# Patient Record
Sex: MALE | Race: WHITE | Employment: FULL TIME | URBAN - METROPOLITAN AREA
[De-identification: names, ages, dates, MRNs, and addresses within clinical notes are randomized per-mention and may not be internally consistent; named-entity substitution may affect disease eponyms.]

---

## 2017-02-20 ENCOUNTER — OFFICE VISIT (OUTPATIENT)
Dept: INTERNAL MEDICINE CLINIC | Age: 67
End: 2017-02-20

## 2017-02-20 VITALS
HEART RATE: 82 BPM | TEMPERATURE: 98.6 F | HEIGHT: 70 IN | RESPIRATION RATE: 16 BRPM | WEIGHT: 175 LBS | BODY MASS INDEX: 25.05 KG/M2 | OXYGEN SATURATION: 98 % | DIASTOLIC BLOOD PRESSURE: 67 MMHG | SYSTOLIC BLOOD PRESSURE: 118 MMHG

## 2017-02-20 DIAGNOSIS — J40 BRONCHITIS: Primary | ICD-10-CM

## 2017-02-20 DIAGNOSIS — J98.9 REACTIVE AIRWAY DISEASE THAT IS NOT ASTHMA: ICD-10-CM

## 2017-02-20 RX ORDER — BUDESONIDE AND FORMOTEROL FUMARATE DIHYDRATE 80; 4.5 UG/1; UG/1
2 AEROSOL RESPIRATORY (INHALATION) 2 TIMES DAILY
Qty: 1 INHALER | Refills: 0 | Status: SHIPPED | OUTPATIENT
Start: 2017-02-20 | End: 2017-03-31 | Stop reason: ALTCHOICE

## 2017-02-20 NOTE — PROGRESS NOTES
Have you been to the ER or urgent care clinic since your last visit? Urgent Care    2/9/17    benzonztate       2/15/17 doxy  For cough and    Drainage     Have you been hospitalized since your last visit? No     Have you been seen or consulted any other health care provider outside of [address] [place] [address] since your last visit (including pap smears, colonoscopy screening)?   No

## 2017-02-20 NOTE — MR AVS SNAPSHOT
Visit Information Date & Time Provider Department Dept. Phone Encounter #  
 2/20/2017  3:20 PM Betsy Chopra NP [facility] [address] [facility] [id number] Your Appointments 3/7/2017  2:30 PM  
COMPLETE PHYSICAL with Pedrito Oliveros MD  
[facility] [place] [address] [place] [address]) Appt Note: cpe  
 [address] [facility] [address]  
[phone]  
  
   
 [address] [place] [address] Upcoming Health Maintenance Date Due Hepatitis C Screening 1950 DTaP/Tdap/Td series (1 - Tdap) 4/13/1969 FOBT Q 1 YEAR AGE 50-75 4/13/1998 ZOSTER VACCINE AGE 60> 4/13/2008 GLAUCOMA SCREENING Q2Y 4/13/2013 MEDICARE YEARLY EXAM 4/13/2013 Pneumococcal 65+ Low/Medium Risk (2 of 2 - PPSV23) 12/11/2016 Allergies as of 2/20/2017  Review Complete On: 2/20/2017 By: Betsy Chopra NP No Known Allergies Current Immunizations  Reviewed on 8/31/2015 Name Date Influenza High Dose Vaccine PF 10/17/2016 Influenza Vaccine 12/11/2015, 1/1/2015 Pneumococcal Conjugate (PCV-13) 12/11/2015 Not reviewed this visit You Were Diagnosed With   
  
 Codes Comments Bronchitis    -  Primary ICD-10-CM: G89 ICD-9-CM: 244 Reactive airway disease that is not asthma     ICD-10-CM: R09.89 ICD-9-CM: 698. 9 Vitals BP Pulse Temp Resp Height(growth percentile) Weight(growth percentile)  
 118/67 (BP 1 Location: Left arm, BP Patient Position: Sitting) 82 98.6 °F (37 °C) (Oral) 16 5' 10\" (1.778 m) 175 lb (79.4 kg) SpO2 BMI Smoking Status 98% 25.11 kg/m2 Never Smoker BMI and BSA Data Body Mass Index Body Surface Area  
 25.11 kg/m 2 1.98 m 2 Preferred Pharmacy Pharmacy Name Phone [id number], [address] [facility] [phone] Your Updated Medication List  
  
   
 This list is accurate as of: 2/20/17  4:16 PM.  Always use your most recent med list.  
  
  
  
  
 biotin 2,500 mcg Tab Take  by mouth.  
  
 budesonide-formoterol 80-4.5 mcg/actuation Hfaa inhaler Commonly known as:  SYMBICORT Take 2 Puffs by inhalation two (2) times a day. Rinse mouth with water after each use FISH OIL 1,000 mg Cap Generic drug:  omega-3 fatty acids-vitamin e Take 1 Cap by mouth.  
  
 sildenafil citrate 50 mg tablet Commonly known as:  VIAGRA Take 1 Tab by mouth as needed. tadalafil 10 mg tablet Commonly known as:  CIALIS Take 10 mg by mouth as needed. VITAMIN D3 1,000 unit Cap Generic drug:  cholecalciferol Take  by mouth. Prescriptions Sent to Pharmacy Refills  
 budesonide-formoterol (SYMBICORT) 80-4.5 mcg/actuation HFAA inhaler 0 Sig: Take 2 Puffs by inhalation two (2) times a day. Rinse mouth with water after each use Class: Normal  
 Pharmacy: [facility] [place] [address] Ph #: [phone] Route: Inhalation Patient Instructions Bronchitis: Care Instructions Your Care Instructions Bronchitis is inflammation of the bronchial tubes, which carry air to the lungs. The tubes swell and produce mucus, or phlegm. The mucus and inflamed bronchial tubes make you cough. You may have trouble breathing. Most cases of bronchitis are caused by viruses like those that cause colds. Antibiotics usually do not help and they may be harmful. Bronchitis usually develops rapidly and lasts about 2 to 3 weeks in otherwise healthy people. Follow-up care is a key part of your treatment and safety. Be sure to make and go to all appointments, and call your doctor if you are having problems. It's also a good idea to know your test results and keep a list of the medicines you take. How can you care for yourself at home? · Take all medicines exactly as prescribed. Call your doctor if you think you are having a problem with your medicine. · Get some extra rest. 
· Take an over-the-counter pain medicine, such as acetaminophen (Tylenol), ibuprofen (Advil, Motrin), or naproxen (Aleve) to reduce fever and relieve body aches. Read and follow all instructions on the label. · Do not take two or more pain medicines at the same time unless the doctor told you to. Many pain medicines have acetaminophen, which is Tylenol. Too much acetaminophen (Tylenol) can be harmful. · Take an over-the-counter cough medicine that contains dextromethorphan to help quiet a dry, hacking cough so that you can sleep. Avoid cough medicines that have more than one active ingredient. Read and follow all instructions on the label. · Breathe moist air from a humidifier, hot shower, or sink filled with hot water. The heat and moisture will thin mucus so you can cough it out. · Do not smoke. Smoking can make bronchitis worse. If you need help quitting, talk to your doctor about stop-smoking programs and medicines. These can increase your chances of quitting for good. When should you call for help? Call 911 anytime you think you may need emergency care. For example, call if: 
· You have severe trouble breathing. Call your doctor now or seek immediate medical care if: 
· You have new or worse trouble breathing. · You cough up dark brown or bloody mucus (sputum). · You have a new or higher fever. · You have a new rash. Watch closely for changes in your health, and be sure to contact your doctor if: 
· You cough more deeply or more often, especially if you notice more mucus or a change in the color of your mucus. · You are not getting better as expected. Where can you learn more? Go to http://dixie-chanelle.info/. Enter H333 in the search box to learn more about \"Bronchitis: Care Instructions. \" Current as of: May 23, 2016 Content Version: 11.1 © 5817-8299 [employer]. Care instructions adapted under license by [employer] (which disclaims liability or warranty for this information). If you have questions about a medical condition or this instruction, always ask your healthcare professional. [address] any warranty or liability for your use of this information. Reactive Airway Disease: Care Instructions Your Care Instructions Reactive airway disease is a breathing problem that appears as wheezing, a whistling noise in your airways. It may be caused by a viral or bacterial infection, allergies, tobacco smoke, or something else in the environment. When you are around these triggers, your body releases chemicals that make the airways get tight. Reactive airway disease is a lot like asthma. Both can cause wheezing. But asthma is ongoing, while reactive airway disease may occur only now and then. Tests can be done to tell whether you have asthma. You may take the same medicines used to treat asthma. Good home care and follow-up care with your doctor can help you recover. Follow-up care is a key part of your treatment and safety. Be sure to make and go to all appointments, and call your doctor if you are having problems. It's also a good idea to know your test results and keep a list of the medicines you take. How can you care for yourself at home? · Take your medicines exactly as prescribed. Call your doctor if you think you are having a problem with your medicine. · Do not smoke or allow others to smoke around you. If you need help quitting, talk to your doctor about stop-smoking programs and medicines. These can increase your chances of quitting for good. · If you know what caused your wheezing (such as perfume or the odor of household chemicals), try to avoid it in the future.  
· Wash your hands several times a day, and consider using hand gels or wipes that contain alcohol. This can prevent colds and other infections. When should you call for help? Call 911 anytime you think you may need emergency care. For example, call if: 
· You have severe trouble breathing. Watch closely for changes in your health, and be sure to contact your doctor if: 
· You cough up yellow, dark brown, or bloody mucus. · You have a fever. · Your wheezing gets worse. Where can you learn more? Go to http://dixie-chanelle.info/. Enter C002 in the search box to learn more about \"Reactive Airway Disease: Care Instructions. \" Current as of: May 23, 2016 Content Version: 11.1 © 6266-5787 [employer]. Care instructions adapted under license by [employer] (which disclaims liability or warranty for this information). If you have questions about a medical condition or this instruction, always ask your healthcare professional. [place] [address] any warranty or liability for your use of this information. Introducing [facility]! [employer] introduces [address] patient portal. Now you can access parts of your medical record, email your doctor's office, and request medication refills online. 1. In your internet browser, go to [email or web address] 2. Click on the First Time User? Click Here link in the Sign In box. You will see the New Member Sign Up page. 3. Enter your [address] Access Code exactly as it appears below. You will not need to use this code after youve completed the sign-up process. If you do not sign up before the expiration date, you must request a new code. · [address] Access Code: [email or web address] Expires: 5/21/2017  4:16 PM 
 
4. Enter the last four digits of your Social Security Number (xxxx) and Date of Birth (mm/dd/yyyy) as indicated and click Submit. You will be taken to the next sign-up page. 5. Create a Inclinixt ID.  This will be your [address] login ID and cannot be changed, so think of one that is secure and easy to remember. 6. Create a [employer] password. You can change your password at any time. 7. Enter your Password Reset Question and Answer. This can be used at a later time if you forget your password. 8. Enter your e-mail address. You will receive e-mail notification when new information is available in [address] 9. Click Sign Up. You can now view and download portions of your medical record. 10. Click the Download Summary menu link to download a portable copy of your medical information. If you have questions, please visit the Frequently Asked Questions section of the [employer] website. Remember, [employer] is NOT to be used for urgent needs. For medical emergencies, dial 911. Now available from your iPhone and Android! Please provide this summary of care documentation to your next provider. Your primary care clinician is listed as Shalom Loja. If you have any questions after today's visit, please call [phone].

## 2017-02-20 NOTE — PATIENT INSTRUCTIONS
Bronchitis: Care Instructions  Your Care Instructions    Bronchitis is inflammation of the bronchial tubes, which carry air to the lungs. The tubes swell and produce mucus, or phlegm. The mucus and inflamed bronchial tubes make you cough. You may have trouble breathing. Most cases of bronchitis are caused by viruses like those that cause colds. Antibiotics usually do not help and they may be harmful. Bronchitis usually develops rapidly and lasts about 2 to 3 weeks in otherwise healthy people. Follow-up care is a key part of your treatment and safety. Be sure to make and go to all appointments, and call your doctor if you are having problems. It's also a good idea to know your test results and keep a list of the medicines you take. How can you care for yourself at home? · Take all medicines exactly as prescribed. Call your doctor if you think you are having a problem with your medicine. · Get some extra rest.  · Take an over-the-counter pain medicine, such as acetaminophen (Tylenol), ibuprofen (Advil, Motrin), or naproxen (Aleve) to reduce fever and relieve body aches. Read and follow all instructions on the label. · Do not take two or more pain medicines at the same time unless the doctor told you to. Many pain medicines have acetaminophen, which is Tylenol. Too much acetaminophen (Tylenol) can be harmful. · Take an over-the-counter cough medicine that contains dextromethorphan to help quiet a dry, hacking cough so that you can sleep. Avoid cough medicines that have more than one active ingredient. Read and follow all instructions on the label. · Breathe moist air from a humidifier, hot shower, or sink filled with hot water. The heat and moisture will thin mucus so you can cough it out. · Do not smoke. Smoking can make bronchitis worse. If you need help quitting, talk to your doctor about stop-smoking programs and medicines. These can increase your chances of quitting for good.   When should you call for help? Call 911 anytime you think you may need emergency care. For example, call if:  · You have severe trouble breathing. Call your doctor now or seek immediate medical care if:  · You have new or worse trouble breathing. · You cough up dark brown or bloody mucus (sputum). · You have a new or higher fever. · You have a new rash. Watch closely for changes in your health, and be sure to contact your doctor if:  · You cough more deeply or more often, especially if you notice more mucus or a change in the color of your mucus. · You are not getting better as expected. Where can you learn more? Go to http://dixie-chanelle.info/. Enter H333 in the search box to learn more about \"Bronchitis: Care Instructions. \"  Current as of: May 23, 2016  Content Version: 11.1  © 0942-0245 [employer]. Care instructions adapted under license by zwoor.[employer] (which disclaims liability or warranty for this information). If you have questions about a medical condition or this instruction, always ask your healthcare professional. [place] [address] any warranty or liability for your use of this information. Reactive Airway Disease: Care Instructions  Your Care Instructions  Reactive airway disease is a breathing problem that appears as wheezing, a whistling noise in your airways. It may be caused by a viral or bacterial infection, allergies, tobacco smoke, or something else in the environment. When you are around these triggers, your body releases chemicals that make the airways get tight. Reactive airway disease is a lot like asthma. Both can cause wheezing. But asthma is ongoing, while reactive airway disease may occur only now and then. Tests can be done to tell whether you have asthma. You may take the same medicines used to treat asthma. Good home care and follow-up care with your doctor can help you recover.   Follow-up care is a key part of your treatment and safety. Be sure to make and go to all appointments, and call your doctor if you are having problems. It's also a good idea to know your test results and keep a list of the medicines you take. How can you care for yourself at home? · Take your medicines exactly as prescribed. Call your doctor if you think you are having a problem with your medicine. · Do not smoke or allow others to smoke around you. If you need help quitting, talk to your doctor about stop-smoking programs and medicines. These can increase your chances of quitting for good. · If you know what caused your wheezing (such as perfume or the odor of household chemicals), try to avoid it in the future. · Wash your hands several times a day, and consider using hand gels or wipes that contain alcohol. This can prevent colds and other infections. When should you call for help? Call 911 anytime you think you may need emergency care. For example, call if:  · You have severe trouble breathing. Watch closely for changes in your health, and be sure to contact your doctor if:  · You cough up yellow, dark brown, or bloody mucus. · You have a fever. · Your wheezing gets worse. Where can you learn more? Go to http://dixie-chanelle.info/. Enter Q704 in the search box to learn more about \"Reactive Airway Disease: Care Instructions. \"  Current as of: May 23, 2016  Content Version: 11.1  © 1084-1576 [employer]. Care instructions adapted under license by [employer] (which disclaims liability or warranty for this information). If you have questions about a medical condition or this instruction, always ask your healthcare professional. [address] any warranty or liability for your use of this information.

## 2017-02-21 NOTE — PROGRESS NOTES
HISTORY OF PRESENT ILLNESS  Camilo Weiner is a 74 y.o. male. HPI  Presents with complaints of persistent cough for the past 2 weeks. Was seen at Urgent care center on 2/9/2017 with chest congestion, cough and diagnosed with viral bronchitis; was treated initially with Tessalon capsules but given Rx for Doxycycline to start if symptoms did not improve; he started this on 2/15 but has continued to cough. Stopped using Tessalon when he started the Doxycycline. Has been having coughing spasms with continual tickle at back of throat. Has not been able to run due to cough and slight shortness of breath. Denies history of asthma. Review of Systems   Constitutional: Positive for malaise/fatigue. Negative for chills and fever. HENT: Negative for congestion and sore throat. Respiratory: Positive for cough and shortness of breath. Negative for sputum production. Cardiovascular: Negative for chest pain and palpitations. Gastrointestinal: Negative for nausea and vomiting. Musculoskeletal: Negative for myalgias. Skin: Negative for rash. Neurological: Negative for dizziness, tingling and headaches. Physical Exam   Constitutional: He is oriented to person, place, and time. He appears well-developed and well-nourished. HENT:   Head: Normocephalic and atraumatic. Right Ear: External ear normal.   Left Ear: External ear normal.   Nose: Nose normal.   Mouth/Throat: Oropharynx is clear and moist.   Neck: Normal range of motion. Neck supple. No thyromegaly present. Cardiovascular: Normal rate and regular rhythm. Pulmonary/Chest: Effort normal. He has no wheezes. Upper chest congestion with bronchospastic cough   Lymphadenopathy:     He has no cervical adenopathy. Neurological: He is alert and oriented to person, place, and time. Psychiatric: He has a normal mood and affect. His behavior is normal.   Nursing note and vitals reviewed.       ASSESSMENT and [address] was seen today for cough.    Diagnoses and all orders for this visit:    Bronchitis -- advised to continue course of Doxycycline and can resume use of Tessalon every 8 hours as needed. Reactive airway disease that is not asthma  -     budesonide-formoterol (SYMBICORT) 80-4.5 mcg/actuation HFAA inhaler; Take 2 Puffs by inhalation two (2) times a day. Rinse mouth with water after each use    Follow up if signs and symptoms worsen or change. After hours number given.    reviewed diet, exercise and weight control  reviewed medications and side effects in detail

## 2017-02-22 ENCOUNTER — TELEPHONE (OUTPATIENT)
Dept: INTERNAL MEDICINE CLINIC | Age: 67
End: 2017-02-22

## 2017-02-22 RX ORDER — PREDNISONE 20 MG/1
20 TABLET ORAL
Qty: 10 TAB | Refills: 0 | Status: SHIPPED | OUTPATIENT
Start: 2017-02-22 | End: 2017-03-07 | Stop reason: ALTCHOICE

## 2017-02-22 RX ORDER — ALBUTEROL SULFATE 90 UG/1
1 AEROSOL, METERED RESPIRATORY (INHALATION)
Qty: 1 INHALER | Refills: 1 | Status: SHIPPED | OUTPATIENT
Start: 2017-02-22

## 2017-02-22 NOTE — TELEPHONE ENCOUNTER
Pt was seen by Kane Edwards on 2/20/17 and states he is still not feeling any better.  Requesting a return call from the nurse [phone]

## 2017-02-22 NOTE — TELEPHONE ENCOUNTER
He needs to give the Symbicort more time to work; has only been on it for 3 days. If still having issues next week, make appointment to be seen.

## 2017-02-22 NOTE — TELEPHONE ENCOUNTER
Spoke to pt, explained new rx's sent in for pt, if he doesn't get better after taking, he will need to follow up with PCP, pt verbalized understanding.

## 2017-02-22 NOTE — TELEPHONE ENCOUNTER
Patient reports still having cough attacks even after taking abx,tessalon and using inhaler symbicort. Pt was wondering if there is something else he can take for the cough?

## 2017-03-07 ENCOUNTER — OFFICE VISIT (OUTPATIENT)
Dept: INTERNAL MEDICINE CLINIC | Age: 67
End: 2017-03-07

## 2017-03-07 ENCOUNTER — HOSPITAL ENCOUNTER (OUTPATIENT)
Dept: GENERAL RADIOLOGY | Age: 67
Discharge: HOME | End: 2017-03-07
Attending: INTERNAL MEDICINE
Payer: COMMERCIAL

## 2017-03-07 VITALS
SYSTOLIC BLOOD PRESSURE: 130 MMHG | HEART RATE: 86 BPM | HEIGHT: 70 IN | DIASTOLIC BLOOD PRESSURE: 83 MMHG | WEIGHT: 176 LBS | BODY MASS INDEX: 25.2 KG/M2 | OXYGEN SATURATION: 98 % | RESPIRATION RATE: 18 BRPM | TEMPERATURE: 98.2 F

## 2017-03-07 DIAGNOSIS — C44.90 SKIN CANCER: ICD-10-CM

## 2017-03-07 DIAGNOSIS — Z00.00 PREVENTATIVE HEALTH CARE: Primary | ICD-10-CM

## 2017-03-07 DIAGNOSIS — J45.998 SEASONAL ASTHMA: ICD-10-CM

## 2017-03-07 DIAGNOSIS — K40.91 UNILATERAL RECURRENT INGUINAL HERNIA WITHOUT OBSTRUCTION OR GANGRENE: ICD-10-CM

## 2017-03-07 DIAGNOSIS — N52.9 ERECTILE DYSFUNCTION, UNSPECIFIED ERECTILE DYSFUNCTION TYPE: ICD-10-CM

## 2017-03-07 DIAGNOSIS — L57.8 SUN-DAMAGED SKIN: ICD-10-CM

## 2017-03-07 DIAGNOSIS — Z23 ENCOUNTER FOR IMMUNIZATION: ICD-10-CM

## 2017-03-07 PROCEDURE — 71020 XR CHEST PA LAT: CPT

## 2017-03-07 RX ORDER — TADALAFIL 10 MG/1
10 TABLET ORAL AS NEEDED
Qty: 10 TAB | Refills: 11 | Status: SHIPPED | OUTPATIENT
Start: 2017-03-07

## 2017-03-07 NOTE — PROGRESS NOTES
HISTORY OF PRESENT ILLNESS  Chyna Garcia is a 74 y.o. male. HPI  Chyna Garcia is here for complete health maintenance physical exam and screening. he does have other concerns. recurrant chest congestion, wheezing, cough in wintertime. Given symbicort, albuterol and eventually prednisone for relief. Better now. Has stopped symbicort. Hx of ? BCC on face and treated with mohs surgery years ago. Has seen dermatologist randomly but without good skin checks since. No suspicious lesions now. Hx of L inguinal hernia which self reduces. Rarely uncomfortable. No changes since last checked. Has been unable to get to surgeon until now. Health maintenance hx includes:  Exercise: moderately active. Form of exercise: walking   Diet: generally follows a low fat low cholesterol diet, generally follows a low sodium diet, exercises sporadically, nonsmoker    Cancer screening:    Colon cancer screening:  Last Colonoscopy: 2010 and was normal   Prostate cancer screening: PSA and/or LUCERO:   Lab Results   Component Value Date/Time    Prostate Specific Ag 1.4 08/31/2015 12:07 PM          Lab Results   Component Value Date/Time    Cholesterol, total 169 08/31/2015 12:07 PM    HDL Cholesterol 45 08/31/2015 12:07 PM    LDL, calculated 108 08/31/2015 12:07 PM    VLDL, calculated 16 08/31/2015 12:07 PM    Triglyceride 78 08/31/2015 12:07 PM       Lab Results   Component Value Date/Time    Glucose 94 08/31/2015 12:07 PM         Immunizations:     Immunization History   Administered Date(s) Administered    Influenza High Dose Vaccine PF 10/17/2016    Influenza Vaccine 01/01/2015, 12/11/2015    Pneumococcal Conjugate (PCV-13) 12/11/2015      Immunization status: up to date and documented. Social History     Social History    Marital status: LEGALLY      Spouse name: N/A    Number of children: N/A    Years of education: N/A     Occupational History    Not on file.      Social History Main Topics  Smoking status: Never Smoker    Smokeless tobacco: Not on file    Alcohol use 1.8 - 2.4 oz/week     3 - 4 Glasses of wine per week    Drug use: No    Sexual activity: Yes     Partners: Female     Other Topics Concern    Not on file     Social History Narrative     History reviewed. No pertinent surgical history. Family History   Problem Relation Age of Onset    Heart Disease Father     Cancer Neg Hx     Diabetes Neg Hx     Hypertension Neg Hx      Current Outpatient Prescriptions on File Prior to Visit   Medication Sig Dispense Refill    albuterol (PROVENTIL HFA, VENTOLIN HFA, PROAIR HFA) 90 mcg/actuation inhaler Take 1 Puff by inhalation every four (4) hours as needed for Wheezing. 1 Inhaler 1    budesonide-formoterol (SYMBICORT) 80-4.5 mcg/actuation HFAA inhaler Take 2 Puffs by inhalation two (2) times a day. Rinse mouth with water after each use 1 Inhaler 0    tadalafil (CIALIS) 10 mg tablet Take 10 mg by mouth as needed. 10 Tab 11     No current facility-administered medications on file prior to visit. .    Review of Systems   Constitutional: Negative for malaise/fatigue and weight loss. HENT: Positive for tinnitus (chronic high pitched, bilateral.  not bothersome. no noise trauma). Negative for ear discharge, ear pain and sore throat. Eyes: Negative for blurred vision and pain. Respiratory: Positive for cough and wheezing (improved as above). Negative for shortness of breath. Cardiovascular: Negative for chest pain, palpitations and leg swelling. Gastrointestinal: Negative for abdominal pain, blood in stool, constipation, diarrhea, heartburn, nausea and vomiting. Genitourinary: Negative. Erectile dysfunction - good result with cialis     Musculoskeletal: Negative for back pain, joint pain and myalgias. Skin: Negative for rash. Neurological: Negative for dizziness and headaches. Endo/Heme/Allergies: Negative for environmental allergies.  Does not bruise/bleed easily. Psychiatric/Behavioral: Negative for depression. The patient is not nervous/anxious and does not have insomnia. Physical Exam   Constitutional: He is oriented to person, place, and time. He appears well-developed and well-nourished. No distress. Body mass index is 25.25 kg/(m^2). /83 (BP 1 Location: Left arm, BP Patient Position: Sitting)  Pulse 86  Temp 98.2 °F (36.8 °C) (Oral)   Resp 18  Ht 5' 10\" (1.778 m)  Wt 176 lb (79.8 kg)  SpO2 98%  BMI 25.25 kg/m2   HENT:   Head: Normocephalic and atraumatic. Right Ear: Hearing, tympanic membrane and ear canal normal.   Left Ear: Hearing, tympanic membrane and ear canal normal.   Nose: Nose normal.   Mouth/Throat: Oropharynx is clear and moist and mucous membranes are normal. Normal dentition. Eyes: Conjunctivae and lids are normal. Pupils are equal, round, and reactive to light. Right eye exhibits no discharge. Left eye exhibits no discharge. No scleral icterus. Neck: Trachea normal. No thyromegaly present. Cardiovascular: Normal rate, regular rhythm, normal heart sounds, intact distal pulses and normal pulses. Exam reveals no gallop and no friction rub. No murmur heard. Pulmonary/Chest: Effort normal and breath sounds normal. No accessory muscle usage. No respiratory distress. He has no decreased breath sounds (diffusely coarse BS). He has no wheezes. He has no rhonchi. He has no rales. Abdominal: Soft. Normal appearance and bowel sounds are normal. He exhibits no distension and no mass. There is no hepatosplenomegaly. There is no tenderness. There is no CVA tenderness. Musculoskeletal: Normal range of motion. He exhibits no edema or tenderness. Lymphadenopathy:     He has no cervical adenopathy. Right: No inguinal adenopathy present. Left: No inguinal adenopathy present. Neurological: He is alert and oriented to person, place, and time. Skin: Skin is warm and dry. No rash noted. He is not diaphoretic. Diffuse moderate sun damaged skin   Psychiatric: He has a normal mood and affect. His speech is normal and behavior is normal. Judgment and thought content normal. Cognition and memory are normal.   Nursing note and vitals reviewed. ASSESSMENT and [facility] was seen today for well male. Diagnoses and all orders for this visit:    Preventative health care  -     Rigo Knapp  he was advised to have follow up colonoscopy in 2020  [address] [facility] [address] [facility] [facility] was counseled on age-appropriate/ guideline-based risk prevention behaviors and screening for a 74y.o. year old   male . We also discussed adjustments in screening based on family history if necessary. Printed instructions for preventative screening guidelines and healthy behaviors given to patient with after visit summary. Seasonal asthma -  ICS/LABA every winter for 2-3 months , albuterol for rescue  Check baseline chest X-ray   -     XR CHEST PA LAT; Future    Unilateral recurrent inguinal hernia without obstruction or gangrene -he would now like to consult surgeon for repair.  -     REFERRAL TO SURGERY    Encounter for immunization  -     PNEUMOCOCCAL POLYSACCHARIDE VACCINE, 23-VALENT, ADULT OR IMMUNOSUPPRESSED PT DOSE,    Erectile dysfunction, unspecified erectile dysfunction type - Well controlled and stable. his medications were reviewed and refilled where necessary as noted below. Labs ordered as noted. -     tadalafil (CIALIS) 10 mg tablet; Take 1 Tab by mouth as needed.     Skin cancer  -     REFERRAL TO DERMATOLOGY    Sun-damaged skin  -     REFERRAL TO DERMATOLOGY      Follow-up Disposition: Not on File  lab results and schedule of future lab studies reviewed with patient  reviewed medications and side effects in detail

## 2017-03-07 NOTE — MR AVS SNAPSHOT
Visit Information Date & Time Provider Department Dept. Phone Encounter #  
 3/7/2017  2:30 PM Kevin Khan MD [facility] [id number] Upcoming Health Maintenance Date Due Hepatitis C Screening 1950 COLONOSCOPY 4/13/1966 DTaP/Tdap/Td series (1 - Tdap) 4/13/1969 ZOSTER VACCINE AGE 60> 4/13/2008 GLAUCOMA SCREENING Q2Y 4/13/2013 Pneumococcal 65+ Low/Medium Risk (2 of 2 - PPSV23) 12/11/2016 MEDICARE YEARLY EXAM 3/8/2018 Allergies as of 3/7/2017  Review Complete On: 3/7/2017 By: Kevin Khan MD  
 No Known Allergies Current Immunizations  Reviewed on 3/7/2017 Name Date Influenza High Dose Vaccine PF 10/17/2016 Influenza Vaccine 12/11/2015, 1/1/2015 Pneumococcal Conjugate (PCV-13) 12/11/2015 Pneumococcal Polysaccharide (PPSV-23)  Incomplete Reviewed by Kevin Khan MD on 3/7/2017 at  3:11 PM  
 Reviewed by Kevin Khan MD on 3/7/2017 at  3:13 PM  
You Were Diagnosed With   
  
 Codes Comments Preventative health care    -  Primary ICD-10-CM: Z00.00 ICD-9-CM: V70.0 Seasonal asthma     ICD-10-CM: J45.998 ICD-9-CM: 493.90 Unilateral recurrent inguinal hernia without obstruction or gangrene     ICD-10-CM: K40.91 
ICD-9-CM: 550.91 Encounter for immunization     ICD-10-CM: E69 ICD-9-CM: V03.89 Erectile dysfunction, unspecified erectile dysfunction type     ICD-10-CM: N52.9 ICD-9-CM: 607.84 Skin cancer     ICD-10-CM: C44.90 ICD-9-CM: 173.90 Vitals BP Pulse Temp Resp Height(growth percentile) Weight(growth percentile) 130/83 (BP 1 Location: Left arm, BP Patient Position: Sitting) 86 98.2 °F (36.8 °C) (Oral) 18 5' 10\" (1.778 m) 176 lb (79.8 kg) SpO2 BMI Smoking Status 98% 25.25 kg/m2 Never Smoker Vitals History BMI and BSA Data Body Mass Index Body Surface Area  
 25.25 kg/m 2 1.99 m 2 Preferred Pharmacy Pharmacy Name Phone [id number] [place] [address] [facility] [phone] Your Updated Medication List  
  
   
This list is accurate as of: 3/7/17  3:25 PM.  Always use your most recent med list.  
  
  
  
  
 albuterol 90 mcg/actuation inhaler Commonly known as:  PROVENTIL HFA, VENTOLIN HFA, PROAIR HFA Take 1 Puff by inhalation every four (4) hours as needed for Wheezing. budesonide-formoterol 80-4.5 mcg/actuation Hfaa inhaler Commonly known as:  SYMBICORT Take 2 Puffs by inhalation two (2) times a day. Rinse mouth with water after each use  
  
 tadalafil 10 mg tablet Commonly known as:  CIALIS Take 1 Tab by mouth as needed. Prescriptions Sent to Pharmacy Refills  
 tadalafil (CIALIS) 10 mg tablet 11 Sig: Take 1 Tab by mouth as needed. Class: Normal  
 Pharmacy: [facility]/pharmacy #[id number]- [place] [address] Ph #: [phone] Route: Oral  
  
We Performed the Following LIPID PANEL [06649 CPT(R)] METABOLIC PANEL, BASIC [02853 CPT(R)] PNEUMOCOCCAL POLYSACCHARIDE VACCINE, 23-VALENT, ADULT OR IMMUNOSUPPRESSED PT DOSE, [67921 CPT(R)] PROSTATE SPECIFIC AG (PSA) [id number] CPT(R)] REFERRAL TO DERMATOLOGY [REF19 Custom] REFERRAL TO SURGERY [RXK876 Custom] To-Do List   
 03/07/2017 Imaging:  XR CHEST PA LAT Referral Information Referral ID Referred By Referred To  
  
 [id number] [facility] [address] [place] [address] Cornel Nguyen MD   
   [address] [place] [address] Phone: [phone] Fax: [phone] Visits Status Start Date End Date 1 New Request 3/7/17 3/7/18 If your referral has a status of pending review or denied, additional information will be sent to support the outcome of this decision. Referral ID Referred By Referred To [id number] MD Hector  
   [facility] [place], [facility] [address] [place] [address] Phone: [phone] Fax: [phone] Visits Status Start Date End Date 1 New Request 3/7/17 3/7/18 If your referral has a status of pending review or denied, additional information will be sent to support the outcome of this decision. Patient Instructions Well Visit, Over 72: Care Instructions Your Care Instructions Physical exams can help you stay healthy. Your doctor has checked your overall health and may have suggested ways to take good care of yourself. He or she also may have recommended tests. At home, you can help prevent illness with healthy eating, regular exercise, and other steps. Follow-up care is a key part of your treatment and safety. Be sure to make and go to all appointments, and call your doctor if you are having problems. It's also a good idea to know your test results and keep a list of the medicines you take. How can you care for yourself at home? · Reach and stay at a healthy weight. This will lower your risk for many problems, such as obesity, diabetes, heart disease, and high blood pressure. · Get at least 30 minutes of exercise on most days of the week. Walking is a good choice. You also may want to do other activities, such as running, swimming, cycling, or playing tennis or team sports. · Do not smoke. Smoking can make health problems worse. If you need help quitting, talk to your doctor about stop-smoking programs and medicines. These can increase your chances of quitting for good. · Protect your skin from too much sun. When you're outdoors from 10 a.m. to 4 p.m., stay in the shade or cover up with clothing and a hat with a wide brim. Wear sunglasses that block UV rays. Even when it's cloudy, put broad-spectrum sunscreen (SPF 30 or higher) on any exposed skin. · See a dentist one or two times a year for checkups and to have your teeth cleaned. · Wear a seat belt in the car. · Limit alcohol to 2 drinks a day for men and 1 drink a day for women. Too much alcohol can cause health problems. Follow your doctor's advice about when to have certain tests. These tests can spot problems early. For men and women · Cholesterol. Your doctor will tell you how often to have this done based on your overall health and other things that can increase your risk for heart attack and stroke. · Blood pressure. Have your blood pressure checked during a routine doctor visit. Your doctor will tell you how often to check your blood pressure based on your age, your blood pressure results, and other factors. · Diabetes. Ask your doctor whether you should have tests for diabetes. · Vision. Experts recommend that you have yearly exams for glaucoma and other age-related eye problems. · Hearing. Tell your doctor if you notice any change in your hearing. You can have tests to find out how well you hear. · Colon cancer tests. Keep having colon cancer tests as your doctor recommends. You can have one of several types of tests. · Heart attack and stroke risk. At least every 4 to 6 years, you should have your risk for heart attack and stroke assessed. Your doctor uses factors such as your age, blood pressure, cholesterol, and whether you smoke or have diabetes to show what your risk for a heart attack or stroke is over the next 10 years. · Osteoporosis. Talk to your doctor about whether you should have a bone density test to find out whether you have thinning bones. Also ask your doctor about whether you should take calcium and vitamin D supplements. For women · Pap test and pelvic exam. You may no longer need a Pap test. Talk with your doctor about whether to stop or continue to have Pap tests. · Breast exam and mammogram. Ask how often you should have a mammogram, which is an X-ray of your breasts.  A mammogram can spot breast cancer before it can be felt and when it is easiest to treat. · Thyroid disease. Talk to your doctor about whether to have your thyroid checked as part of a regular physical exam. Women have an increased chance of a thyroid problem. For men · Prostate exam. Talk to your doctor about whether you should have a blood test (called a PSA test) for prostate cancer. Experts disagree on whether men should have this test. Some experts recommend that you discuss the benefits and risks of the test with your doctor. · Abdominal aortic aneurysm. Ask your doctor whether you should have a test to check for an aneurysm. You may need a test if you ever smoked or if your parent, brother, sister, or child has had an aneurysm. When should you call for help? Watch closely for changes in your health, and be sure to contact your doctor if you have any problems or symptoms that concern you. Where can you learn more? Go to http://dixie-chanelle.info/. Enter A942 in the search box to learn more about \"Well Visit, Over 65: Care Instructions. \" Current as of: July 19, 2016 Content Version: 11.1 © 5092-9815 Healthwise, Incorporated. Care instructions adapted under license by [employer] (which disclaims liability or warranty for this information). If you have questions about a medical condition or this instruction, always ask your healthcare professional. [address] any warranty or liability for your use of this information. Introducing \[facility]\"" & HEALTH SERVICES! Kandi Miles introduces [employer] patient portal. Now you can access parts of your medical record, email your doctor's office, and request medication refills online. 1. In your internet browser, go to [email or web address] 2. Click on the First Time User? Click Here link in the Sign In box. You will see the New Member Sign Up page. 3. Enter your [employer] Access Code exactly as it appears below.  You will not need to use this code after youve completed the sign-up process. If you do not sign up before the expiration date, you must request a new code. · [employer] Access Code: [email or web address] Expires: 5/21/2017  4:16 PM 
 
4. Enter the last four digits of your Social Security Number (xxxx) and Date of Birth (mm/dd/yyyy) as indicated and click Submit. You will be taken to the next sign-up page. 5. Create a [employer] ID. This will be your [employer] login ID and cannot be changed, so think of one that is secure and easy to remember. 6. Create a [employer] password. You can change your password at any time. 7. Enter your Password Reset Question and Answer. This can be used at a later time if you forget your password. 8. Enter your e-mail address. You will receive e-mail notification when new information is available in [address] 9. Click Sign Up. You can now view and download portions of your medical record. 10. Click the Download Summary menu link to download a portable copy of your medical information. If you have questions, please visit the Frequently Asked Questions section of the [employer] website. Remember, [employer] is NOT to be used for urgent needs. For medical emergencies, dial 911. Now available from your iPhone and Android! Please provide this summary of care documentation to your next provider. Your primary care clinician is listed as Kendell Dickson. If you have any questions after today's visit, please call [phone].

## 2017-03-07 NOTE — PROGRESS NOTES
[facility]  Timeout Progress Note    Chart reviewed for allergies/reaction to any medications. TIMEOUT initiated prior to start of procedure:       Yes No: yes Patient identified by name and date of birth     Yes No: yes Informed consent obtained     Yes No: yes Procedure site marked and verified     Yes No: yes Procedure to be performed verified, patient confirms understanding     Yes No: yes Pain assessment pre-procedure - Pain 0/10     Yes No: yes Pain assessment post-procedure - Pain 0/10     Yes No: yes Patient education provided     Yes No: yes Post-procedure instructions provided to patient     Consent form signed and verified. Patient tolerated procedure well.

## 2017-03-07 NOTE — PATIENT INSTRUCTIONS

## 2017-03-09 LAB
BUN SERPL-MCNC: 18 MG/DL (ref 8–27)
BUN/CREAT SERPL: 20 (ref 10–22)
CALCIUM SERPL-MCNC: 8.6 MG/DL (ref 8.6–10.2)
CHLORIDE SERPL-SCNC: 97 MMOL/L (ref 96–106)
CHOLEST SERPL-MCNC: 156 MG/DL (ref 100–199)
CO2 SERPL-SCNC: 24 MMOL/L (ref 18–29)
CREAT SERPL-MCNC: 0.88 MG/DL (ref 0.76–1.27)
GLUCOSE SERPL-MCNC: 111 MG/DL (ref 65–99)
HDLC SERPL-MCNC: 41 MG/DL
INTERPRETATION, 910389: NORMAL
LDLC SERPL CALC-MCNC: 98 MG/DL (ref 0–99)
POTASSIUM SERPL-SCNC: 4.2 MMOL/L (ref 3.5–5.2)
PSA SERPL-MCNC: 1.1 NG/ML (ref 0–4)
SODIUM SERPL-SCNC: 138 MMOL/L (ref 134–144)
TRIGL SERPL-MCNC: 83 MG/DL (ref 0–149)
VLDLC SERPL CALC-MCNC: 17 MG/DL (ref 5–40)

## 2017-03-10 ENCOUNTER — TELEPHONE (OUTPATIENT)
Dept: INTERNAL MEDICINE CLINIC | Age: 67
End: 2017-03-10

## 2017-03-10 NOTE — TELEPHONE ENCOUNTER
Demi called to follow up on the xray the patient had done on 3/7/2017. She was verifying that Dr. Estephanie Ramon is able to see the report. Informed report is in the system and will be sent to Dr. Estephanie Ramon for review.

## 2017-03-10 NOTE — TELEPHONE ENCOUNTER
I have attempted without success to contact this patient by phone to I left a message on answering machine.   Who is Crystal??

## 2017-03-15 ENCOUNTER — OFFICE VISIT (OUTPATIENT)
Dept: INTERNAL MEDICINE CLINIC | Age: 67
End: 2017-03-15

## 2017-03-15 VITALS
BODY MASS INDEX: 25.43 KG/M2 | DIASTOLIC BLOOD PRESSURE: 84 MMHG | WEIGHT: 177.6 LBS | RESPIRATION RATE: 12 BRPM | TEMPERATURE: 98.3 F | OXYGEN SATURATION: 96 % | SYSTOLIC BLOOD PRESSURE: 137 MMHG | HEART RATE: 91 BPM | HEIGHT: 70 IN

## 2017-03-15 DIAGNOSIS — R06.09 DOE (DYSPNEA ON EXERTION): ICD-10-CM

## 2017-03-15 DIAGNOSIS — R05.3 CHRONIC COUGH: Primary | ICD-10-CM

## 2017-03-15 NOTE — MR AVS SNAPSHOT
Visit Information Date & Time Provider Department Dept. Phone Encounter #  
 3/15/2017  1:45 PM Brendon Shaffer MD [facility] [address] [facility] [id number] Follow-up Instructions Return in about 1 week (around 3/22/2017). Upcoming Health Maintenance Date Due Hepatitis C Screening 1950 COLONOSCOPY 4/13/1966 DTaP/Tdap/Td series (1 - Tdap) 4/13/1969 ZOSTER VACCINE AGE 60> 4/13/2008 GLAUCOMA SCREENING Q2Y 4/13/2013 MEDICARE YEARLY EXAM 3/8/2018 Allergies as of 3/15/2017  Review Complete On: 3/15/2017 By: Jay Nguyen No Known Allergies Current Immunizations  Reviewed on 3/7/2017 Name Date Influenza High Dose Vaccine PF 10/17/2016 Influenza Vaccine 12/11/2015, 1/1/2015 Pneumococcal Conjugate (PCV-13) 12/11/2015 Pneumococcal Polysaccharide (PPSV-23) 3/7/2017 Not reviewed this visit You Were Diagnosed With   
  
 Codes Comments Chronic cough    -  Primary ICD-10-CM: I10 ICD-9-CM: 786.2 DEMARCO (dyspnea on exertion)     ICD-10-CM: R06.09 
ICD-9-CM: 786.09 Vitals BP Pulse Temp Resp Height(growth percentile) Weight(growth percentile) 137/84 (BP 1 Location: Left arm, BP Patient Position: Sitting) 91 98.3 °F (36.8 °C) (Oral) 12 5' 10\" (1.778 m) 177 lb 9.6 oz (80.6 kg) SpO2 BMI Smoking Status 96% 25.48 kg/m2 Never Smoker Vitals History BMI and BSA Data Body Mass Index Body Surface Area  
 25.48 kg/m 2 2 m 2 Preferred Pharmacy Pharmacy Name Phone [id number] [place], [address] [facility] [phone] Your Updated Medication List  
  
   
This list is accurate as of: 3/15/17  2:26 PM.  Always use your most recent med list.  
  
  
  
  
 albuterol 90 mcg/actuation inhaler Commonly known as:  PROVENTIL HFA, VENTOLIN HFA, PROAIR HFA  
 Take 1 Puff by inhalation every four (4) hours as needed for Wheezing. budesonide-formoterol 80-4.5 mcg/actuation Hfaa inhaler Commonly known as:  SYMBICORT Take 2 Puffs by inhalation two (2) times a day. Rinse mouth with water after each use  
  
 tadalafil 10 mg tablet Commonly known as:  CIALIS Take 1 Tab by mouth as needed. We Performed the Following CBC WITH AUTOMATED DIFF [90514 CPT(R)] D DIMER [id number] CPT(R)] Follow-up Instructions Return in about 1 week (around 3/22/2017). To-Do List   
 03/16/2017 Imaging:  CT CHEST W WO CONT Introducing [facility] & HEALTH SERVICES! Devin Pedersen introduces [employer] patient portal. Now you can access parts of your medical record, email your doctor's office, and request medication refills online. 1. In your internet browser, go to [email or web address] 2. Click on the First Time User? Click Here link in the Sign In box. You will see the New Member Sign Up page. 3. Enter your [employer] Access Code exactly as it appears below. You will not need to use this code after youve completed the sign-up process. If you do not sign up before the expiration date, you must request a new code. · [employer] Access Code: [email or web address] Expires: 5/21/2017  5:16 PM 
 
4. Enter the last four digits of your Social Security Number (xxxx) and Date of Birth (mm/dd/yyyy) as indicated and click Submit. You will be taken to the next sign-up page. 5. Create a Hyphen 8t ID. This will be your [employer] login ID and cannot be changed, so think of one that is secure and easy to remember. 6. Create a [employer] password. You can change your password at any time. 7. Enter your Password Reset Question and Answer. This can be used at a later time if you forget your password. 8. Enter your e-mail address. You will receive e-mail notification when new information is available in [address] 9. Click Sign Up. You can now view and download portions of your medical record. 10. Click the Download Summary menu link to download a portable copy of your medical information. If you have questions, please visit the Frequently Asked Questions section of the [employer] website. Remember, [employer] is NOT to be used for urgent needs. For medical emergencies, dial 911. Now available from your iPhone and Android! Please provide this summary of care documentation to your next provider. Your primary care clinician is listed as Fernando Castillo. If you have any questions after today's visit, please call [phone].

## 2017-03-15 NOTE — PROGRESS NOTES
HISTORY OF PRESENT ILLNESS  Johana Loaiza is a 74 y.o. male. HPI  Problem follow up: Johana Loaiza returns for follow up visit regarding ongoing cough, mild dyspnea on exertion. he was seen 1 weeks ago in office diagnosed with persistant cough, ? Seasonal cough variant asthma and treated with symbicort/ albuterol. Workup was significant for chest X-ray  Exam: 2 view chest     Indication: Cough, asthma, coarse breath sounds.     COMPARISON: None     PA and lateral views demonstrate normal heart size.     There is a left pleural effusion present. This is small but does blunt the left  lateral posterior costophrenic angles. This causes some atelectasis at the left  lung base. Etiology is uncertain follow-up to resolution is necessary.     Right lung is clear. No adenopathy.     IMPRESSION  IMPRESSION:  1. There is a small left pleural effusion. Etiology is uncertain. Infectious  inflammatory or neoplastic processes should be considered. Follow-up to  resolution is suggested. 23X     .  Notes, labs, studies, imaging related to this problem during prior visit were available . Since that visit, he has not changed or possibly improved. he has been compliant with prescribed treatment. Residual symptoms include: recurring cough, mild dyspnea on exertion. The patient denies fevers, chills or sweats. No chest pain, leg swelling. New issues associated with this problem include: none. Patient Active Problem List   Diagnosis Code    Skin cancer C44.90     Past Medical History:   Diagnosis Date    Asthma     Basal cell carcinoma of left forehead     Sangaray    Hernia      No Known Allergies  Current Outpatient Prescriptions on File Prior to Visit   Medication Sig Dispense Refill    tadalafil (CIALIS) 10 mg tablet Take 1 Tab by mouth as needed. 10 Tab 11    albuterol (PROVENTIL HFA, VENTOLIN HFA, PROAIR HFA) 90 mcg/actuation inhaler Take 1 Puff by inhalation every four (4) hours as needed for Wheezing.  1 Inhaler 1    budesonide-formoterol (SYMBICORT) 80-4.5 mcg/actuation HFAA inhaler Take 2 Puffs by inhalation two (2) times a day. Rinse mouth with water after each use 1 Inhaler 0     No current facility-administered medications on file prior to visit. Social History   Substance Use Topics    Smoking status: Never Smoker    Smokeless tobacco: None    Alcohol use 1.8 - 2.4 oz/week     3 - 4 Glasses of wine per week         ROS    Physical Exam   Constitutional: He appears well-developed and well-nourished. No distress. /84 (BP 1 Location: Left arm, BP Patient Position: Sitting)  Pulse 91  Temp 98.3 °F (36.8 °C) (Oral)   Resp 12  Ht 5' 10\" (1.778 m)  Wt 177 lb 9.6 oz (80.6 kg)  SpO2 96%  BMI 25.48 kg/m2Body mass index is 25.48 kg/(m^2). HENT:   Mouth/Throat: Oropharynx is clear and moist.   Neck: No JVD present. Carotid bruit is not present. Cardiovascular: Normal rate, regular rhythm, normal heart sounds and intact distal pulses. No murmur heard. Pulses:       Posterior tibial pulses are 1+ on the right side, and 1+ on the left side. Pulmonary/Chest: Effort normal. No accessory muscle usage. No respiratory distress. He has decreased breath sounds in the left lower field. He has no wheezes. He has no rhonchi. He has no rales. Musculoskeletal: He exhibits no edema. Neurological: He is alert. Skin: Skin is warm and dry. He is not diaphoretic. Nursing note and vitals reviewed. ASSESSMENT and [facility] was seen today for cough. Diagnoses and all orders for this visit:    Chronic cough -? persistant mild asthma vs resolving URI - ? Pertussis vs underlying chronic lung disease given small pleural effusion. No major risks for VTE, CHF, lung CA, IPF. Will get DD and CT chest.  Close follow up.   Consider ECHO if still not obvious.  -     CT CHEST W WO CONT; Future  -     D DIMER    DEMARCO (dyspnea on exertion)  -     CT CHEST W WO CONT; Future  -     CBC WITH AUTOMATED DIFF      Follow-up Disposition:  Return in about 1 week (around 3/22/2017).

## 2017-03-16 LAB
BASOPHILS # BLD AUTO: 0 X10E3/UL (ref 0–0.2)
BASOPHILS NFR BLD AUTO: 1 %
D DIMER PPP FEU-MCNC: 2.15 MG/L FEU (ref 0–0.49)
EOSINOPHIL # BLD AUTO: 0.7 X10E3/UL (ref 0–0.4)
EOSINOPHIL NFR BLD AUTO: 9 %
ERYTHROCYTE [DISTWIDTH] IN BLOOD BY AUTOMATED COUNT: 13.2 % (ref 12.3–15.4)
HCT VFR BLD AUTO: 37.1 % (ref 37.5–51)
HGB BLD-MCNC: 12.3 G/DL (ref 12.6–17.7)
IMM GRANULOCYTES # BLD: 0 X10E3/UL (ref 0–0.1)
IMM GRANULOCYTES NFR BLD: 0 %
LYMPHOCYTES # BLD AUTO: 1.6 X10E3/UL (ref 0.7–3.1)
LYMPHOCYTES NFR BLD AUTO: 19 %
MCH RBC QN AUTO: 30.9 PG (ref 26.6–33)
MCHC RBC AUTO-ENTMCNC: 33.2 G/DL (ref 31.5–35.7)
MCV RBC AUTO: 93 FL (ref 79–97)
MONOCYTES # BLD AUTO: 0.3 X10E3/UL (ref 0.1–0.9)
MONOCYTES NFR BLD AUTO: 4 %
MORPHOLOGY BLD-IMP: ABNORMAL
NEUTROPHILS # BLD AUTO: 5.7 X10E3/UL (ref 1.4–7)
NEUTROPHILS NFR BLD AUTO: 67 %
PLATELET # BLD AUTO: 282 X10E3/UL (ref 150–379)
RBC # BLD AUTO: 3.98 X10E6/UL (ref 4.14–5.8)
WBC # BLD AUTO: 8.4 X10E3/UL (ref 3.4–10.8)

## 2017-03-20 ENCOUNTER — HOSPITAL ENCOUNTER (OUTPATIENT)
Dept: CT IMAGING | Age: 67
Discharge: HOME | End: 2017-03-20
Attending: INTERNAL MEDICINE
Payer: COMMERCIAL

## 2017-03-20 DIAGNOSIS — R06.09 DOE (DYSPNEA ON EXERTION): ICD-10-CM

## 2017-03-20 DIAGNOSIS — R05.3 CHRONIC COUGH: ICD-10-CM

## 2017-03-20 PROCEDURE — 74011636320 HC RX REV CODE- 636/320: Performed by: INTERNAL MEDICINE

## 2017-03-20 PROCEDURE — 71260 CT THORAX DX C+: CPT

## 2017-03-20 RX ADMIN — IOPAMIDOL 100 ML: 612 INJECTION, SOLUTION INTRAVENOUS at 09:01

## 2017-03-24 ENCOUNTER — TELEPHONE (OUTPATIENT)
Dept: INTERNAL MEDICINE CLINIC | Age: 67
End: 2017-03-24

## 2017-03-24 ENCOUNTER — OFFICE VISIT (OUTPATIENT)
Dept: INTERNAL MEDICINE CLINIC | Age: 67
End: 2017-03-24

## 2017-03-24 VITALS
TEMPERATURE: 98.2 F | DIASTOLIC BLOOD PRESSURE: 73 MMHG | HEART RATE: 80 BPM | HEIGHT: 70 IN | RESPIRATION RATE: 18 BRPM | OXYGEN SATURATION: 98 % | SYSTOLIC BLOOD PRESSURE: 134 MMHG | BODY MASS INDEX: 25.21 KG/M2 | WEIGHT: 176.13 LBS

## 2017-03-24 DIAGNOSIS — J90 PLEURAL EFFUSION: Primary | ICD-10-CM

## 2017-03-24 DIAGNOSIS — R05.9 COUGH: ICD-10-CM

## 2017-03-24 DIAGNOSIS — I31.39 PERICARDIAL EFFUSION: ICD-10-CM

## 2017-03-24 NOTE — TELEPHONE ENCOUNTER
Voicemail left informing patient of upcoming appointments because he was driving. His 2D echocardiogram at [facility] on 3/29/2017 with him arriving at 12:30 pm to their [facility] location ([phone]). His thoracentesis is scheduled at [facility] on 3/28/17 ([phone]).

## 2017-03-24 NOTE — MR AVS SNAPSHOT
Visit Information Date & Time Provider Department Dept. Phone Encounter #  
 3/24/2017  8:30 AM Krystal Templeton MD [facility] [facility] [id number] Follow-up Instructions Return in about 2 weeks (around 4/7/2017). Upcoming Health Maintenance Date Due Hepatitis C Screening 1950 COLONOSCOPY 4/13/1966 DTaP/Tdap/Td series (1 - Tdap) 4/13/1969 ZOSTER VACCINE AGE 60> 4/13/2008 GLAUCOMA SCREENING Q2Y 4/13/2013 MEDICARE YEARLY EXAM 3/8/2018 Allergies as of 3/24/2017  Review Complete On: 3/24/2017 By: Meena Lobo LPN No Known Allergies Current Immunizations  Reviewed on 3/7/2017 Name Date Influenza High Dose Vaccine PF 10/17/2016 Influenza Vaccine 12/11/2015, 1/1/2015 Pneumococcal Conjugate (PCV-13) 12/11/2015 Pneumococcal Polysaccharide (PPSV-23) 3/7/2017 Not reviewed this visit You Were Diagnosed With   
  
 Codes Comments Pleural effusion    -  Primary ICD-10-CM: J90 ICD-9-CM: 511.9 Pericardial effusion     ICD-10-CM: I31.3 ICD-9-CM: 423.9 Vitals BP Pulse Temp Resp Height(growth percentile) Weight(growth percentile) 134/73 (BP 1 Location: Left arm, BP Patient Position: Sitting) 80 98.2 °F (36.8 °C) (Oral) 18 5' 10\" (1.778 m) 176 lb 2 oz (79.9 kg) SpO2 BMI Smoking Status 98% 25.27 kg/m2 Never Smoker Vitals History BMI and BSA Data Body Mass Index Body Surface Area  
 25.27 kg/m 2 1.99 m 2 Preferred Pharmacy Pharmacy Name Phone [id number] [address] [phone] Your Updated Medication List  
  
   
This list is accurate as of: 3/24/17  8:45 AM.  Always use your most recent med list.  
  
  
  
  
 albuterol 90 mcg/actuation inhaler Commonly known as:  PROVENTIL HFA, VENTOLIN HFA, PROAIR HFA  
 Take 1 Puff by inhalation every four (4) hours as needed for Wheezing. budesonide-formoterol 80-4.5 mcg/actuation Hfaa inhaler Commonly known as:  SYMBICORT Take 2 Puffs by inhalation two (2) times a day. Rinse mouth with water after each use  
  
 tadalafil 10 mg tablet Commonly known as:  CIALIS Take 1 Tab by mouth as needed. Follow-up Instructions Return in about 2 weeks (around 4/7/2017). To-Do List   
 03/27/2017 Imaging:  US THORACENTESIS NDL PUNC ASP W IMAGE   
  
 03/31/2017 ECHO:  ECHO 2D ADULT Introducing [facility]! Jacque Garduno introduces [employer] patient portal. Now you can access parts of your medical record, email your doctor's office, and request medication refills online. 1. In your internet browser, go to [email or web address] 2. Click on the First Time User? Click Here link in the Sign In box. You will see the New Member Sign Up page. 3. Enter your [employer] Access Code exactly as it appears below. You will not need to use this code after youve completed the sign-up process. If you do not sign up before the expiration date, you must request a new code. · [employer] Access Code: [email or web address] Expires: 5/21/2017  5:16 PM 
 
4. Enter the last four digits of your Social Security Number (xxxx) and Date of Birth (mm/dd/yyyy) as indicated and click Submit. You will be taken to the next sign-up page. 5. Create a GlobeInt ID. This will be your Sab[employer]at login ID and cannot be changed, so think of one that is secure and easy to remember. 6. Create a [employer] password. You can change your password at any time. 7. Enter your Password Reset Question and Answer. This can be used at a later time if you forget your password. 8. Enter your e-mail address. You will receive e-mail notification when new information is available in [address] 9. Click Sign Up. You can now view and download portions of your medical record. 10. Click the Download Summary menu link to download a portable copy of your medical information. If you have questions, please visit the Frequently Asked Questions section of the [employer] website. Remember, [employer] is NOT to be used for urgent needs. For medical emergencies, dial 911. Now available from your iPhone and Android! Please provide this summary of care documentation to your next provider. Your primary care clinician is listed as Manolo Espinal. If you have any questions after today's visit, please call [phone].

## 2017-03-24 NOTE — PROGRESS NOTES
HISTORY OF PRESENT ILLNESS  Steven Munguia is a 74 y.o. male. HPI  Problem follow up: Steven Munguia returns for follow up visit regarding persistant cough. he was seen 2 weeks ago in office diagnosed with pleural effusion, ? Allergic asthma related cough and treated with symbicort/ albuterol. Workup was significant for     Clinical history: Pleural effusion, prolonged cough     INDICATION: pleural effusion, prolonged cough     COMPARISON: CXR 3/7/2017     TECHNIQUE: Following the uneventful intravenous administration of 100 cc  Isovue-300, 5 mm axial images were obtained through the chest. Coronal and  sagittal reconstructions were generated. CT dose reduction was achieved through  use of a standardized protocol tailored for this examination and automatic  exposure control for dose modulation. Adaptive statistical iterative  reconstruction (ASIR) was utilized.     FINDINGS:     There is a small to moderate left-sided pleural effusion with a pericardial  effusion as well. There is some high density material within the pericardial  effusion. Allowing for differences in technique not significantly changed since  chest x-ray 3/7/2017     THORACIC AORTA: No dissection or aneurysm. MAIN PULMONARY ARTERY: Normal in caliber. TRACHEA/BRONCHI: Patent. ESOPHAGUS: No wall thickening or dilatation. HEART: Normal in size.     LUNGS: No nodule, mass, or airspace disease. INCIDENTALLY IMAGED UPPER ABDOMEN: No focal abnormality. BONES: No destructive bone lesion. Hemangioma midthoracic spine.     IMPRESSION  IMPRESSION:  Small to moderate left-sided pleural effusion with minimal atelectasis. There is  a mild to moderate pericardial effusion as well. When compared to the chest  x-ray of 3/7/2017 not significantly diminished in size.               DDimer not helpful as elevated. No tachycardia/hypoxia/ hx of VTE/DVT or risks/   . Notes, labs, studies, imaging related to this problem during prior visit were available . Since that visit, he has improved some. he has been compliant with prescribed treatment. Residual symptoms include: mild mostly dry cough. No palpitations, chest pain, shortness of breath. The patient denies fevers, chills or sweats. No wt loss  New issues associated with this problem include:  none. Patient Active Problem List   Diagnosis Code    Skin cancer C44.90     Past Medical History:   Diagnosis Date    Asthma     Basal cell carcinoma of left forehead     Sangaray    Hernia      No Known Allergies  Current Outpatient Prescriptions on File Prior to Visit   Medication Sig Dispense Refill    tadalafil (CIALIS) 10 mg tablet Take 1 Tab by mouth as needed. 10 Tab 11    albuterol (PROVENTIL HFA, VENTOLIN HFA, PROAIR HFA) 90 mcg/actuation inhaler Take 1 Puff by inhalation every four (4) hours as needed for Wheezing. 1 Inhaler 1    budesonide-formoterol (SYMBICORT) 80-4.5 mcg/actuation HFAA inhaler Take 2 Puffs by inhalation two (2) times a day. Rinse mouth with water after each use 1 Inhaler 0     No current facility-administered medications on file prior to visit. Social History   Substance Use Topics    Smoking status: Never Smoker    Smokeless tobacco: None    Alcohol use 1.8 - 2.4 oz/week     3 - 4 Glasses of wine per week         ROS    Physical Exam   Constitutional: He appears well-developed and well-nourished. No distress. /73 (BP 1 Location: Left arm, BP Patient Position: Sitting)  Pulse 80  Temp 98.2 °F (36.8 °C) (Oral)   Resp 18  Ht 5' 10\" (1.778 m)  Wt 176 lb 2 oz (79.9 kg)  SpO2 98%  BMI 25.27 kg/m2Body mass index is 25.27 kg/(m^2). HENT:   Mouth/Throat: Oropharynx is clear and moist.   Neck: No JVD present. Carotid bruit is not present. Cardiovascular: Normal rate, regular rhythm, normal heart sounds and intact distal pulses. Pulmonary/Chest: Effort normal. No accessory muscle usage. No respiratory distress.  He has decreased breath sounds in the left lower field. He has no wheezes. He has no rhonchi. He has no rales. 1 inch differential higher in dullness to percussion L base. Musculoskeletal: He exhibits no edema. Neurological: He is alert. Skin: Skin is warm and dry. He is not diaphoretic. Nursing note and vitals reviewed. ASSESSMENT and [address] was seen today for cough. Diagnoses and all orders for this visit:    Pleural effusion - unclear diagnosis. Improving cough. Transudate or exudate? ? Sympathetic effusion/prior community-acquired pneumonia? Vs neoplastic if exudative. Need thoracentesis. Possible pulmonary consult afterwards. -     US THORACENTESIS NDL PUNC ASP W IMAGE; Future  -     EKG, 12 LEAD, INITIAL; Future  -     AMB POC EKG ROUTINE W/ 12 LEADS, INTER & REP    Pericardial effusion - no rub on exam.  No chest pain or congestive heart failure. Check echo to quantify.  -     ECHO 2D ADULT; Future    Cough  Close follow up    Follow-up Disposition:  Return in about 2 weeks (around 4/7/2017).

## 2017-03-30 ENCOUNTER — HOSPITAL ENCOUNTER (OUTPATIENT)
Dept: ULTRASOUND IMAGING | Age: 67
Discharge: HOME | End: 2017-03-30
Attending: INTERNAL MEDICINE
Payer: COMMERCIAL

## 2017-03-30 VITALS
OXYGEN SATURATION: 99 % | RESPIRATION RATE: 18 BRPM | BODY MASS INDEX: 27.2 KG/M2 | SYSTOLIC BLOOD PRESSURE: 137 MMHG | DIASTOLIC BLOOD PRESSURE: 66 MMHG | HEART RATE: 70 BPM | WEIGHT: 190 LBS | HEIGHT: 70 IN

## 2017-03-30 DIAGNOSIS — J90 PLEURAL EFFUSION: ICD-10-CM

## 2017-03-30 PROCEDURE — 76604 US EXAM CHEST: CPT

## 2017-03-30 NOTE — PROGRESS NOTES
3036 patient brought back for assessment and prep prior to ultrasound guided thoracentesis. Baseline V.S.'s obtained. I.V. Started. Lungs CTA with diminished in lower left lobe. S1S2. Denies blood thinners. PTA meds reviewed. Patient NPO since 2000. Ride present. 1887 Dr. Jason Printers at bedside and consent signed with review of risks and benefits. 9205 Patient brought to Ultrasound and placed on stretcher sitting/propped up,connected to dynamap. Dr. Jason Printers at bedside and scanned. Not enough fluid to continue with procedure. Patient agreed with Dr. Aurea Kelly recommendation to follow-up with PCP and canceled procedure based on improving symptoms and diminished fluid. 1010 Patient brought back to [facility] and dressed and I.V. Removed and escorted out to awaiting ride.

## 2017-03-31 ENCOUNTER — OFFICE VISIT (OUTPATIENT)
Dept: CARDIOLOGY CLINIC | Age: 67
End: 2017-03-31

## 2017-03-31 ENCOUNTER — CLINICAL SUPPORT (OUTPATIENT)
Dept: CARDIOLOGY CLINIC | Age: 67
End: 2017-03-31

## 2017-03-31 VITALS
WEIGHT: 177 LBS | HEIGHT: 70 IN | SYSTOLIC BLOOD PRESSURE: 132 MMHG | RESPIRATION RATE: 18 BRPM | HEART RATE: 68 BPM | BODY MASS INDEX: 25.34 KG/M2 | OXYGEN SATURATION: 97 % | DIASTOLIC BLOOD PRESSURE: 80 MMHG

## 2017-03-31 DIAGNOSIS — R06.00 DYSPNEA, UNSPECIFIED TYPE: ICD-10-CM

## 2017-03-31 DIAGNOSIS — I45.10 RBBB: ICD-10-CM

## 2017-03-31 DIAGNOSIS — I31.39 PERICARDIAL EFFUSION: Primary | ICD-10-CM

## 2017-03-31 DIAGNOSIS — J90 PLEURAL EFFUSION: ICD-10-CM

## 2017-03-31 NOTE — MR AVS SNAPSHOT
Visit Information Date & Time Provider Department Dept. Phone Encounter #  
 3/31/2017 10:00 AM Sherrie Laird MD [facility] [phone] [id number] Your Appointments 4/5/2017  2:45 PM  
ROUTINE CARE with Essence Arechiga MD  
[facility] [address] [place] [address]) Appt Note: f/u  
 [place] [place] [facility] [address] [id number]  
[phone]  
  
   
 [address] [facility] [address] Upcoming Health Maintenance Date Due Hepatitis C Screening 1950 COLONOSCOPY 4/13/1966 DTaP/Tdap/Td series (1 - Tdap) 4/13/1969 ZOSTER VACCINE AGE 60> 4/13/2008 GLAUCOMA SCREENING Q2Y 4/13/2013 MEDICARE YEARLY EXAM 3/8/2018 Allergies as of 3/31/2017  Review Complete On: 3/31/2017 By: Joseph Stanley No Known Allergies Current Immunizations  Reviewed on 3/7/2017 Name Date Influenza High Dose Vaccine PF 10/17/2016 Influenza Vaccine 12/11/2015, 1/1/2015 Pneumococcal Conjugate (PCV-13) 12/11/2015 Pneumococcal Polysaccharide (PPSV-23) 3/7/2017 Not reviewed this visit Vitals BP Pulse Resp Height(growth percentile) Weight(growth percentile) SpO2  
 132/80 (BP 1 Location: Left arm) 68 18 5' 10\" (1.778 m) 177 lb (80.3 kg) 97% BMI Smoking Status 25.4 kg/m2 Never Smoker Vitals History BMI and BSA Data Body Mass Index Body Surface Area  
 25.4 kg/m 2 1.99 m 2 Preferred Pharmacy Pharmacy Name Phone [id number] [address] [phone] Your Updated Medication List  
  
   
This list is accurate as of: 3/31/17 10:24 AM.  Always use your most recent med list.  
  
  
  
  
 albuterol 90 mcg/actuation inhaler Commonly known as:  PROVENTIL HFA, VENTOLIN HFA, PROAIR HFA Take 1 Puff by inhalation every four (4) hours as needed for Wheezing. tadalafil 10 mg tablet Commonly known as:  CIALIS Take 1 Tab by mouth as needed. Introducing [facility]! [employer] introduces [employer] patient portal. Now you can access parts of your medical record, email your doctor's office, and request medication refills online. 1. In your internet browser, go to [email or web address] [email or web address] 2. Click on the First Time User? Click Here link in the Sign In box. You will see the New Member Sign Up page. 3. Enter your [employer] Access Code exactly as it appears below. You will not need to use this code after youve completed the sign-up process. If you do not sign up before the expiration date, you must request a new code. · [employer] Access Code: [email or web address] Expires: 5/21/2017  5:16 PM 
 
4. Enter the last four digits of your Social Security Number (xxxx) and Date of Birth (mm/dd/yyyy) as indicated and click Submit. You will be taken to the next sign-up page. 5. Create a [employer] ID. This will be your [employer] login ID and cannot be changed, so think of one that is secure and easy to remember. 6. Create a [employer] password. You can change your password at any time. 7. Enter your Password Reset Question and Answer. This can be used at a later time if you forget your password. 8. Enter your e-mail address. You will receive e-mail notification when new information is available in [address] 9. Click Sign Up. You can now view and download portions of your medical record. 10. Click the Download Summary menu link to download a portable copy of your medical information. If you have questions, please visit the Frequently Asked Questions section of the [employer] website. Remember, [employer] is NOT to be used for urgent needs. For medical emergencies, dial 911. Now available from your iPhone and Android! Please provide this summary of care documentation to your next provider. Your primary care clinician is listed as Veronica Wilson. If you have any questions after today's visit, please call [phone].

## 2017-03-31 NOTE — PROGRESS NOTES
Jovita Ziegler MD    [id number] [facility], [address] [facility]    Office [phone],[facility] [phone]  Pager [phone] Camilo Marcano is a 74 y.o. male referred for evaluation of pericardial effusion. Consult requested by Jamaal Dave MD  Dear Dr. Shannon Braswell    Primary care physician:  Jamaal Dave MD    Dear Dr Shannon Braswell,    I had the pleasure of seeing Mr Caro Ramsay in the office today. Chief complaint:  Steph Clemente is a 74 y.o. male who complains of   Chief Complaint   Patient presents with    New Patient     abnormal CT       Assessment:    Pericardial effusion noted on a CT scan   3/20/17. History of cough/dyspnea/left pleural effusion-almost resolved  Right bundle branch block on EKG  Echo-mild MR/AR/TR; normal LVEF/mild LVH            Plan:   Echocardiogram-done today/no significant pericardial effusion. .  Patient symptoms have almost resolved. He probably had a reaction to a pulmonary infection which caused him to have the pleural/pericardial effusion noted on the CT. Patient dyspnea is probably related to his pulmonary issues. Patient is a runner and usually runs 2 half marathons yearly. Prior to being sick recently, was able to run without having any chest pain/dyspnea. Discussed with him that if his dyspnea recurs, he will need ischemia workup. He will return for a follow-up visit in 6 months or earlier as needed. Will need echocardiogram in the year   Aggressive cardiovascular risk factor modification. Patient understands the plan. All questions were answered to the patient's satisfaction. Medication Side Effects and Warnings were discussed with patient: yes  Patient Labs were reviewed and or requested:  yes  Patient Past Records were reviewed and or requested: yes    I appreciate the opportunity to be involved in [address]. Please see note below for details. Please do not hesitate to contact us with questions or concerns.     King RAUSCH Rosalee Youssef MD    Cardiac Testing/ Procedures: A. Cardiac Cath/PCI:    B.ECHO/ELENITA: 3/31/17-Left ventricle: Systolic function was normal. Ejection fraction wasestimated in the range of 55 % to 60 %. There were no regional wall motion abnormalities. There was mild concentric hypertrophy. Left atrium: The atrium was mildly dilated. Mild MR/AR/TR         C. StressNuclear/Stress ECHO/Stress test:    D.Vascular:    E. EP:    F. 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 CT scan showed \"Small to moderate left-sided pleural effusion with minimal atelectasis. There is a mild to moderate pericardial effusion as well. When compared to the chest x-ray of 3/7/2017 not significantly diminished in size\". Dr. Jm Dunham scheduled thoracentesis by interventional radiology. When he came to get the thoracentesis done on 3/30/17 - US chest - \"Decreased small left pleural effusion since 10 days ago. No thoracentesis atthis time. I informed the patient of the findings and recommendation at the time of the study\"    Previously he had significant dyspnea/cough. However, he states that the dyspnea is improved and he is almost at his baseline. Patient is a runner and runs 2 half marathons yearly. He has not it got back to running but states that he is able to do daily activities without being dyspneic. Kirk Alexandra Denies any history of allergies. Denies any history of TB/travel outside the [address]    Denies history of CAD/arrhythmia. FHx - from his parents but he states that his father  at age 46 from MI (used to be a smoker). He is not sure about his mother's health. He has 1 brother and 1 sister and they do not have any CAD.         Past Medical History:   Diagnosis Date    Asthma     Basal cell carcinoma of left forehead     Sangaray    Hernia       History reviewed. No pertinent surgical history. Family History   Problem Relation Age of Onset    Heart Disease Father     Cancer Neg Hx     Diabetes Neg Hx     Hypertension Neg Hx       Social History   Substance Use Topics    Smoking status: Never Smoker    Smokeless tobacco: None    Alcohol use 1.8 - 2.4 oz/week     3 - 4 Glasses of wine per week          Medications before admission:    Current Outpatient Prescriptions   Medication Sig Dispense    tadalafil (CIALIS) 10 mg tablet Take 1 Tab by mouth as needed. 10 Tab    albuterol (PROVENTIL HFA, VENTOLIN HFA, PROAIR HFA) 90 mcg/actuation inhaler Take 1 Puff by inhalation every four (4) hours as needed for Wheezing. 1 Inhaler     No current facility-administered medications for this visit. Review of Systems:  (bold if positive, if negative)    Gen:  Eyes:  ENT:  CVS:  Pulm:  GI:    :    MS:  arthritisSkin:  Psych:  Endo:    Hem:  Renal:    Neuro:        Physical Exam:  Visit Vitals    /80 (BP 1 Location: Left arm)    Pulse 68    Resp 18    Ht 5' 10\" (1.778 m)    Wt 177 lb (80.3 kg)    SpO2 97%    BMI 25.4 kg/m2          Gen: Well-developed, well-nourished, in no acute distress  HEENT:  Pink conjunctivae, hearing intact to voice, moist mucous membranes  Neck: Supple,No JVD, No Carotid Bruit, Thyroid- non tender  Resp: No accessory muscle use, Clear breath sounds, No rales or rhonchi  Card: Regular Rate,Rythm,Normal S1, S2, No murmurs, rubs or gallop. No thrills.    Abd:  Soft, non-tender, non-distended, normoactive bowel sounds are present,   MSK: No cyanosis or clubbing  Skin: No rashes or ulcers  Neuro:   moving all four extremities, no focal deficit, follows commands appropriately  Psych:  Good insight, oriented to person, place and time, alert, Nml Affect  LE: No edema  Vascular: Radial pulses 2+ and symmetric        EKG: Sinus rhythm, right bundle branch block, normal axis,      Cxray:    LABS:    No results for input(s): CPK, TROIQ in the last 72 hours.     No lab exists for component: CKQMB, CPKMB    Lab Results   Component Value Date/Time    WBC 8.4 03/15/2017 02:39 PM    HGB 12.3 03/15/2017 02:39 PM    HCT 37.1 03/15/2017 02:39 PM    PLATELET 958 14/97/3903 02:39 PM    MCV 93 03/15/2017 02:39 PM     Lab Results   Component Value Date/Time    Sodium 138 03/08/2017 09:02 AM    Potassium 4.2 03/08/2017 09:02 AM    Chloride 97 03/08/2017 09:02 AM    CO2 24 03/08/2017 09:02 AM    Glucose 111 03/08/2017 09:02 AM    BUN 18 03/08/2017 09:02 AM    Creatinine 0.88 03/08/2017 09:02 AM    BUN/Creatinine ratio 20 03/08/2017 09:02 AM    GFR est  03/08/2017 09:02 AM    GFR est non-AA 88 03/08/2017 09:02 AM    Calcium 8.6 03/08/2017 09:02 AM             Jennifer Dyson MD

## 2017-04-10 DIAGNOSIS — I31.39 PERICARDIAL EFFUSION: ICD-10-CM

## 2017-04-10 DIAGNOSIS — J90 PLEURAL EFFUSION: ICD-10-CM

## 2017-06-08 ENCOUNTER — HOSPITAL ENCOUNTER (OUTPATIENT)
Dept: GENERAL RADIOLOGY | Age: 67
Discharge: HOME | End: 2017-06-08
Attending: INTERNAL MEDICINE
Payer: COMMERCIAL

## 2017-06-08 DIAGNOSIS — R05.9 COUGH: ICD-10-CM

## 2017-06-08 PROCEDURE — 71020 XR CHEST PA LAT: CPT

## 2018-04-03 ENCOUNTER — HOSPITAL ENCOUNTER (OUTPATIENT)
Dept: GENERAL RADIOLOGY | Age: 68
Discharge: HOME | End: 2018-04-03
Attending: INTERNAL MEDICINE
Payer: COMMERCIAL

## 2018-04-03 DIAGNOSIS — R05.9 COUGH: ICD-10-CM

## 2018-04-03 PROCEDURE — 71046 X-RAY EXAM CHEST 2 VIEWS: CPT

## 2018-05-04 ENCOUNTER — OFFICE VISIT (OUTPATIENT)
Dept: CARDIOLOGY CLINIC | Age: 68
End: 2018-05-04

## 2018-05-04 VITALS
SYSTOLIC BLOOD PRESSURE: 110 MMHG | HEART RATE: 52 BPM | BODY MASS INDEX: 25.08 KG/M2 | HEIGHT: 70 IN | DIASTOLIC BLOOD PRESSURE: 78 MMHG | WEIGHT: 175.2 LBS | RESPIRATION RATE: 14 BRPM | OXYGEN SATURATION: 98 %

## 2018-05-04 DIAGNOSIS — I38 VALVULAR HEART DISEASE: Primary | ICD-10-CM

## 2018-05-04 NOTE — PROGRESS NOTES
Melony Stokes MD    [id number] [facility] [place], [address] [facility]    Office [phone],[facility] [phone]  Pager [phone] Chi Velasco is a 77 y.o. male is here for f/u visit. Primary care physician:  Brandon Palacios MD    Patient Active Problem List   Diagnosis Code    Skin cancer C44.90       Dear Dr. Olvin Trejo,    I had the pleasure of seeing Mr. Pierce Burkitt in the office today. Chief complaint:  Chief Complaint   Patient presents with    Annual Exam       Assessment:    Mild valvular heart disease  Right bundle branch block on EKG  Echo 3/2017-mild MR/AR/TR; normal LVEF/mild LVH    Plan:  Patient is doing well. Follow-up in 1 year with echocardiogram prior to visit. Lipids per PCP. Aggressive prevascular spelled for infection. Medication Side Effects and Warnings were discussed with patient: yes  Patient Labs were reviewed and or requested:  yes  Patient Past Records were reviewed and or requested: yes    I appreciate the opportunity to be involved in [address]. See note below for details. Please do not hesitate to contact us with questions or concerns. Melony Stokes MD    Cardiac Testing/ Procedures: A. Cardiac Cath/PCI:    B.ECHO/ELENITA: 3/2017 EF 55-60%, mild concentric LVH, mild LAE, mild MR, mild AR, mild TR    C. StressNuclear/Stress ECHO/Stress test:    D.Vascular:    E. EP:    F. Miscellaneous:    Subjective:  Pierce Burkitt is a 77 y.o. male who returns for follow up as it. Patient initially was seen by me last year after having a small pericardial effusion in a setting of a resp illiness. Doing well. Running half marathons. No dyspnea, chest pain. ROS:  (bold if positive, if negative)             Medications before admission:    Current Outpatient Prescriptions   Medication Sig Dispense    tadalafil (CIALIS) 10 mg tablet Take 1 Tab by mouth as needed.  10 Tab    albuterol (PROVENTIL HFA, VENTOLIN HFA, PROAIR HFA) 90 mcg/actuation inhaler Take 1 Puff by inhalation every four (4) hours as needed for Wheezing. (Patient not taking: Reported on 5/4/2018) 1 Inhaler     No current facility-administered medications for this visit. Family History of CAD:    Yes    Social History:  Current  Smoker  No    Physical Exam:  Visit Vitals    /78 (BP 1 Location: Right arm, BP Patient Position: Sitting)    Pulse (!) 52    Resp 14    Ht 5' 10\" (1.778 m)    Wt 175 lb 3.2 oz (79.5 kg)    SpO2 98%    BMI 25.14 kg/m2          Gen: Well-developed, well-nourished, in no acute distress  Neck: Supple,No JVD, No Carotid Bruit,   Resp: No accessory muscle use, Clear breath sounds, No rales or rhonchi  Card: Regular Rate,Rythm,Normal S1, S2, No murmurs, rubs or gallop. No thrills.    Abd:  Soft, non-tender, non-distended,BS+,   MSK: No cyanosis  Skin: No rashes    Neuro: moving all four extremities , follows commands appropriately  Psych:  Good insight, oriented to person, place , alert, Nml Affect  LE: No edema    EKG: Sinus bradycardia, right bundle branch block, nonspecific ST-T changes      LABS:        Lab Results   Component Value Date/Time    WBC 8.4 03/15/2017 02:39 PM    HGB 12.3 (L) 03/15/2017 02:39 PM    HCT 37.1 (L) 03/15/2017 02:39 PM    PLATELET 506 06/52/8674 02:39 PM     Lab Results   Component Value Date/Time    Sodium 138 03/08/2017 09:02 AM    Potassium 4.2 03/08/2017 09:02 AM    Chloride 97 03/08/2017 09:02 AM    CO2 24 03/08/2017 09:02 AM    Glucose 111 (H) 03/08/2017 09:02 AM    BUN 18 03/08/2017 09:02 AM    Creatinine 0.88 03/08/2017 09:02 AM    BUN/Creatinine ratio 20 03/08/2017 09:02 AM    GFR est  03/08/2017 09:02 AM    GFR est non-AA 88 03/08/2017 09:02 AM    Calcium 8.6 03/08/2017 09:02 AM       No results found for: APTT  No results found for: INR, PTMR, PTP, PT1, PT2  No components found for: John Vaz MD

## 2018-05-04 NOTE — PROGRESS NOTES
Chief Complaint   Patient presents with    Annual Exam     1. Have you been to the ER, urgent care clinic since your last visit? Hospitalized since your last visit? No    2. Have you seen or consulted any other health care providers outside of the [address] [place] [address] since your last visit? Include any pap smears or colon screening.  No    Visit Vitals    /78 (BP 1 Location: Right arm, BP Patient Position: Sitting)    Pulse (!) 52    Resp 14    Ht 5' 10\" (1.778 m)    Wt 175 lb 3.2 oz (79.5 kg)    SpO2 98%    BMI 25.14 kg/m2

## 2019-08-02 ENCOUNTER — OFFICE VISIT (OUTPATIENT)
Dept: CARDIOLOGY CLINIC | Age: 69
End: 2019-08-02

## 2019-08-02 VITALS
BODY MASS INDEX: 24.77 KG/M2 | DIASTOLIC BLOOD PRESSURE: 70 MMHG | SYSTOLIC BLOOD PRESSURE: 124 MMHG | HEART RATE: 56 BPM | WEIGHT: 173 LBS | HEIGHT: 70 IN

## 2019-08-02 DIAGNOSIS — I45.10 RBBB: Primary | ICD-10-CM

## 2019-08-02 NOTE — PROGRESS NOTES
Chief Complaint   Patient presents with    Other     Valvular heart disease, Echo with FIDELINA Hitchcock     Visit Vitals  /70 (BP 1 Location: Left arm, BP Patient Position: Sitting)   Pulse (!) 56   Ht 5' 10\" (1.778 m)   Wt 173 lb (78.5 kg)   BMI 24.82 kg/m²     Chest pain denied  SOB denied; \"thinks I'm getting asthma\"  Dizziness denied  Swelling denied  Recent hospital visit denied    Echo by Sue Cervantes  EKG complete    Dr. Young Thomas  [phone]

## 2019-08-02 NOTE — PROGRESS NOTES
Allie Dalton MD    [id number] [facility], [address] [facility]    Office [phone],XKR [phone]  Pager [phone] Karan Chavarria is a 68 y.o. male is here for f/u visit. Primary care physician:  Wenceslao Jacobsen MD    Patient Active Problem List   Diagnosis Code    Skin cancer C44.90           I had the pleasure of seeing Mr. Yari Henderson in the office today. Chief complaint:  Chief Complaint   Patient presents with    Other     Valvular heart disease, Echo with [place] Fragmin, RBBB       Assessment:    Mild valvular heart disease  Right bundle branch block on EKG  Echo 3/2017-mild MR/AR/TR; normal LVEF/mild LVH    Plan:  Patient is doing well. Follow-up in 1 year . Echo every other year. Lipids per PCP. Aggressive cardiovascular risk factor modification      Medication Side Effects and Warnings were discussed with patient: yes  Patient Labs were reviewed and or requested:  yes  Patient Past Records were reviewed and or requested: yes    I appreciate the opportunity to be involved in [address]. See note below for details. Please do not hesitate to contact us with questions or concerns. Allie Dalton MD    Cardiac Testing/ Procedures: A. Cardiac Cath/PCI:    B.ECHO/ELENITA: 8/2/19 - EF 55-60%; Mild conc LVH; Mild MR/AR/TR  3/2017 EF 55-60%, mild concentric LVH, mild LAE, mild MR, mild AR, mild TR    C. StressNuclear/Stress ECHO/Stress test:    D.Vascular:    E. EP:    F. Miscellaneous:    Subjective:  Yari Henderson is a 68 y.o. male who returns for follow up as it. Patient initially was seen by  after having a small pericardial effusion in a setting of a resp illiness ( 2017). Doing well. Running daily. No dyspnea, chest pain. ROS:  (bold if positive, if negative)             Medications before admission:    Current Outpatient Medications   Medication Sig Dispense    tadalafil (CIALIS) 10 mg tablet Take 1 Tab by mouth as needed.  10 Tab    albuterol (PROVENTIL HFA, VENTOLIN HFA, PROAIR HFA) 90 mcg/actuation inhaler Take 1 Puff by inhalation every four (4) hours as needed for Wheezing. (Patient not taking: Reported on 5/4/2018) 1 Inhaler     No current facility-administered medications for this visit. Family History of CAD:    Yes    Social History:  Current  Smoker  No    Physical Exam:  Visit Vitals  /70 (BP 1 Location: Left arm, BP Patient Position: Sitting)   Pulse (!) 56   Ht 5' 10\" (1.778 m)   Wt 173 lb (78.5 kg)   BMI 24.82 kg/m²          Gen: Well-developed, well-nourished, in no acute distress  Neck: Supple,No JVD, No Carotid Bruit,   Resp: No accessory muscle use, Clear breath sounds, No rales or rhonchi  Card: Regular Rate,Rythm,Normal S1, S2, No murmurs, rubs or gallop. No thrills.    Abd:  Soft, non-tender, non-distended,BS+,   MSK: No cyanosis  Skin: No rashes    Neuro: moving all four extremities , follows commands appropriately  Psych:  Good insight, oriented to person, place , alert, Nml Affect  LE: No edema    EKG:       LABS:        Lab Results   Component Value Date/Time    WBC 8.4 03/15/2017 02:39 PM    HGB 12.3 (L) 03/15/2017 02:39 PM    HCT 37.1 (L) 03/15/2017 02:39 PM    PLATELET 607 38/69/2655 02:39 PM     Lab Results   Component Value Date/Time    Sodium 138 03/08/2017 09:02 AM    Potassium 4.2 03/08/2017 09:02 AM    Chloride 97 03/08/2017 09:02 AM    CO2 24 03/08/2017 09:02 AM    Glucose 111 (H) 03/08/2017 09:02 AM    BUN 18 03/08/2017 09:02 AM    Creatinine 0.88 03/08/2017 09:02 AM    BUN/Creatinine ratio 20 03/08/2017 09:02 AM    GFR est  03/08/2017 09:02 AM    GFR est non-AA 88 03/08/2017 09:02 AM    Calcium 8.6 03/08/2017 09:02 AM       No results found for: APTT  No results found for: INR, PTMR, PTP, PT1, PT2  No components found for: Jl Ortiz MD

## 2022-03-20 PROBLEM — C44.90 SKIN CANCER: Status: ACTIVE | Noted: 2017-03-07

## 2023-03-20 ENCOUNTER — ANESTHESIA EVENT (OUTPATIENT)
Dept: ENDOSCOPY | Age: 73
End: 2023-03-20
Payer: MEDICARE

## 2023-03-20 NOTE — ANESTHESIA PREPROCEDURE EVALUATION
Relevant Problems   PERSONAL HX & FAMILY HX OF CANCER   (+) Skin cancer       Anesthetic History   No history of anesthetic complications            Review of Systems / Medical History  Patient summary reviewed, nursing notes reviewed and pertinent labs reviewed    Pulmonary            Asthma        Neuro/Psych   Within defined limits           Cardiovascular      Valvular problems/murmurs: tricuspid insufficiency and mitral insufficiency              Comments: ECG (8/2/19): Sinus  Bradycardia  -First degree A-V block   Merary = 224  -Right bundle branch block. ABNORMAL     TTE ((8/2/19): Left Ventricle: Normal cavity size, wall thickness, systolic function (ejection fraction normal) and diastolic function. Calculated left ventricular ejection fraction is 60%. Biplane method used to measure ejection fraction. No regional wall motion abnormality noted. Left Atrium: Moderately dilated left atrium. Mitral Valve: Mild mitral valve regurgitation. Tricuspid Valve: Mild tricuspid valve regurgitation is present. Pulmonary arterial systolic pressure is 49.0 mmHg.      GI/Hepatic/Renal               Comments: Hx colon polyps Endo/Other        Cancer (Hx Basal cell carcinoma of left forehead s/p excision )     Other Findings              Physical Exam    Airway  Mallampati: II  TM Distance: 4 - 6 cm  Neck ROM: normal range of motion        Cardiovascular  Regular rate and rhythm,  S1 and S2 normal,  no murmur, click, rub, or gallop             Dental    Dentition: Implants  Comments: None in the front   Pulmonary  Breath sounds clear to auscultation               Abdominal  GI exam deferred       Other Findings            Anesthetic Plan    ASA: 2  Anesthesia type: MAC and total IV anesthesia          Induction: Intravenous  Anesthetic plan and risks discussed with: Patient

## 2023-03-21 ENCOUNTER — HOSPITAL ENCOUNTER (OUTPATIENT)
Age: 73
Setting detail: OUTPATIENT SURGERY
Discharge: HOME | End: 2023-03-21
Attending: INTERNAL MEDICINE | Admitting: INTERNAL MEDICINE
Payer: MEDICARE

## 2023-03-21 ENCOUNTER — ANESTHESIA (OUTPATIENT)
Dept: ENDOSCOPY | Age: 73
End: 2023-03-21
Payer: MEDICARE

## 2023-03-21 VITALS
HEART RATE: 58 BPM | SYSTOLIC BLOOD PRESSURE: 128 MMHG | DIASTOLIC BLOOD PRESSURE: 60 MMHG | WEIGHT: 172.8 LBS | TEMPERATURE: 97.4 F | RESPIRATION RATE: 20 BRPM | HEIGHT: 71 IN | OXYGEN SATURATION: 96 % | BODY MASS INDEX: 24.19 KG/M2

## 2023-03-21 PROCEDURE — 77030021593 HC FCPS BIOP ENDOSC BSC -A: Performed by: INTERNAL MEDICINE

## 2023-03-21 PROCEDURE — 76040000019: Performed by: INTERNAL MEDICINE

## 2023-03-21 PROCEDURE — 88305 TISSUE EXAM BY PATHOLOGIST: CPT

## 2023-03-21 PROCEDURE — 77030013992 HC SNR POLYP ENDOSC BSC -B: Performed by: INTERNAL MEDICINE

## 2023-03-21 PROCEDURE — 74011250636 HC RX REV CODE- 250/636: Performed by: ANESTHESIOLOGY

## 2023-03-21 PROCEDURE — 76060000031 HC ANESTHESIA FIRST 0.5 HR: Performed by: INTERNAL MEDICINE

## 2023-03-21 PROCEDURE — 2709999900 HC NON-CHARGEABLE SUPPLY: Performed by: INTERNAL MEDICINE

## 2023-03-21 PROCEDURE — 74011250636 HC RX REV CODE- 250/636: Performed by: INTERNAL MEDICINE

## 2023-03-21 PROCEDURE — 74011000250 HC RX REV CODE- 250: Performed by: ANESTHESIOLOGY

## 2023-03-21 RX ORDER — LIDOCAINE HYDROCHLORIDE 20 MG/ML
INJECTION, SOLUTION EPIDURAL; INFILTRATION; INTRACAUDAL; PERINEURAL AS NEEDED
Status: DISCONTINUED | OUTPATIENT
Start: 2023-03-21 | End: 2023-03-21 | Stop reason: HOSPADM

## 2023-03-21 RX ORDER — SODIUM CHLORIDE 9 MG/ML
25 INJECTION, SOLUTION INTRAVENOUS CONTINUOUS
Status: DISCONTINUED | OUTPATIENT
Start: 2023-03-21 | End: 2023-03-21 | Stop reason: HOSPADM

## 2023-03-21 RX ORDER — PROPOFOL 10 MG/ML
INJECTION, EMULSION INTRAVENOUS AS NEEDED
Status: DISCONTINUED | OUTPATIENT
Start: 2023-03-21 | End: 2023-03-21 | Stop reason: HOSPADM

## 2023-03-21 RX ADMIN — LIDOCAINE HYDROCHLORIDE 40 MG: 20 INJECTION, SOLUTION EPIDURAL; INFILTRATION; INTRACAUDAL; PERINEURAL at 12:02

## 2023-03-21 RX ADMIN — SODIUM CHLORIDE 25 ML/HR: 9 INJECTION, SOLUTION INTRAVENOUS at 11:37

## 2023-03-21 RX ADMIN — PROPOFOL 200 MG: 10 INJECTION, EMULSION INTRAVENOUS at 12:20

## 2023-03-21 NOTE — DISCHARGE INSTRUCTIONS
Cirilo Valdez MD  [facility], [address]  [place], [address] [place] [address]  [phone]  [email or web address]    Mercy Landin  [id number]  1950    COLON DISCHARGE INSTRUCTIONS  Discomfort:  Redness at IV site- apply warm compress to area; if redness or soreness persist- contact your physician  There may be a slight amount of blood passed from the rectum  Gaseous discomfort- walking, belching will help relieve any discomfort  You may not operate a vehicle for 12 hours  You may not engage in an occupation involving machinery or appliances for rest of today  You may not drink alcoholic beverages for at least 12 hours  Avoid making any critical decisions for at least 24 hour  DIET:   High fiber diet. - however -  remember your colon is empty and a heavy meal will produce gas. Avoid these foods:  vegetables, fried / greasy foods, carbonated drinks for today      ACTIVITY:  You may resume your normal daily activities it is recommended that you spend the remainder of the day resting -  avoid any strenuous activity. CALL M.D. ANY SIGN OF:   Increasing pain, nausea, vomiting  Abdominal distension (swelling)  New increased bleeding (oral or rectal)  Fever (chills)  Pain in chest area  Bloody discharge from nose or mouth  Shortness of breath     COLONOSCOPY FINDINGS:  Your colonoscopy showed: four small polyps which were removed, sigmoid diverticulosis and internal hemorrhoids. Follow-up Instructions:   Call Dr. Cirilo Valdez if any questions or problems. Telephone # [phone]  Dr. Bob Fernández office will notify you of the biopsy results within 7 to 10 days. Should have a repeat colonoscopy in 3 years.    Patient Education on Sedation / Analgesia Administered for Procedure      For 24 hours after general anesthesia or intravenous analgesia / sedation:  Have someone responsible help you with your care  Limit your activities  Do not drive and operate hazardous machinery  Do not make important personal, legal or business decisions  Do not drink alcoholic beverages  If you have not urinated within 8 hours after discharge, please contact your physician  Resume your medications unless otherwise instructed    For 24 hours after general anesthesia or intravenous analgesia / sedation  you may experience:  Drowsiness, dizziness, sleepiness, or confusion  Difficulty remembering or delayed reaction times  Difficulty with your balance, especially while walking, move slowly and carefully, do not make sudden position changes  Difficulty focusing or blurred vision    You may not be aware of slight changes in your behavior and/or your reaction time because of the medication used during and after your procedure.     Report the following to your physician:  Excessive pain, swelling, redness or odor of or around the surgical area  Temperature over 100.5  Nausea and vomiting lasting longer than 4 hours or if unable to take medications  Any signs of decreased circulation or nerve impairment to extremity: change in color, persistent numbness, tingling, coldness or increase pain  Any questions or concerns    IF YOU REPORT TO AN EMERGENCY ROOM, DOCTOR'S OFFICE OR HOSPITAL WITHIN 24 HOURS AFTER YOUR PROCEDURE, BRING THIS SHEET AND YOUR AFTER VISIT SUMMARY WITH YOU AND GIVE IT TO THE PHYSICIAN OR NURSE ATTENDING YOU.

## 2023-03-21 NOTE — ROUTINE PROCESS
TRANSFER - IN REPORT:    Verbal report received from Toya(name) on [address] [facility]  being received from [facility](unit) for routine progression of care      Report consisted of patients Situation, Background, Assessment and   Recommendations(SBAR). Information from the following report(s) SBAR, Procedure Summary, and MAR was reviewed with the receiving nurse. Opportunity for questions and clarification was provided. Assessment completed upon patients arrival to unit and care assumed.

## 2023-03-21 NOTE — PERIOP NOTES
Endoscopy Case End Note:    0983:  Procedure scope was pre-cleaned, per protocol, at bedside by Moreno Eaton. 1223:  Report received from anesthesia - Dr. Jeromy Ferris DO. See anesthesia flowsheet for intra-procedure vital signs and events. 1225:  Glasses returned to patient.

## 2023-03-21 NOTE — PERIOP NOTES
See anesthesia note and MAR for medications given during procedure. Received report from anesthesia staff on vital signs and status of patient.      Endoscope was pre-cleaned at the bedside immediately following procedure by LOLLY Whitten

## 2023-03-21 NOTE — H&P
Gabriella Goodwin MD  [facility], [address], [address], [address] [place] [address]  [phone]  [email or web address]    Gastroenterology Outpatient History and Physical    Patient: Marcos Nunez    Physician: Mae Dolan MD    Vital Signs: Blood pressure (!) 143/91, pulse 70, temperature 98 °F (36.7 °C), resp. rate 22, height 5' 11\" (1.803 m), weight 78.4 kg (172 lb 12.8 oz), SpO2 99 %. Allergies: No Known Allergies    Chief Complaint: Hx of polyps    History of Present Illness: Personal history of colonic polyps. Last colonoscopy was 2019 and showed polyps which were removed. Currently has no GI symptoms. No FH of colon cancer or polyps. History:  Past Medical History:   Diagnosis Date    Asthma     Basal cell carcinoma of left forehead     Sangaray    Hernia     History reviewed. No pertinent surgical history. Social History     Socioeconomic History    Marital status:    Tobacco Use    Smoking status: Never    Smokeless tobacco: Never   Substance and Sexual Activity    Alcohol use: Yes     Alcohol/week: 3.0 - 4.0 standard drinks     Types: 3 - 4 Glasses of wine per week    Drug use: No    Sexual activity: Yes     Partners: Female      Family History   Problem Relation Age of Onset    Heart Disease Father     Cancer Neg Hx     Diabetes Neg Hx     Hypertension Neg Hx       Patient Active Problem List   Diagnosis Code    Skin cancer C44.90       Medications:   Prior to Admission medications    Medication Sig Start Date End Date Taking? Authorizing Provider   tadalafil (CIALIS) 10 mg tablet Take 1 Tab by mouth as needed. 3/7/17   Marialuisa Martinez MD   albuterol (PROVENTIL HFA, VENTOLIN HFA, PROAIR HFA) 90 mcg/actuation inhaler Take 1 Puff by inhalation every four (4) hours as needed for Wheezing.   Patient not taking: No sig reported 2/22/17   Lorenzo Lewis NP       Physical Exam:     General: well developed, well nourished   HEENT: unremarkable   Heart: regular rhythm no mumur    Lungs: clear   Abdominal:  benign   Neurological: unremarkable   Extremities: no edema     Findings/Diagnosis: Personal history of colonic polyps    Plan of Care/Planned Procedure: Colonoscopy with monitored anesthesia care sedation    Signed:  Nadia Sena MD 3/21/2023

## 2023-03-21 NOTE — PROCEDURES
[facility]                  Colonoscopy Operative Report    3/21/2023      Jessica Gill  [id number]  1950    Procedure Type:   Colonoscopy --screening     Indications:    Personal history of colon polyps (screening only)     Pre-operative Diagnosis: see indication above    Post-operative Diagnosis:  See findings below    :  Anabelle Manzo MD    Referring Provider: Christina Padilla MD      Sedation:  MAC anesthesia Propofol    Pre-Procedural Exam:      Airway: clear,  No airway problems anticipated  Heart: RRR, without gallops or rubs  Lungs: clear bilaterally without wheezes, crackles, or rhonchi  Abdomen: soft, nontender, nondistended, bowel sounds present  Mental Status: awake, alert and oriented to person, place and time     Procedure Details:  After informed consent was obtained with all risks and benefits of procedure explained and preoperative exam completed, the patient was taken to the endoscopy suite and placed in the left lateral decubitus position. Upon sequential sedation as per above, a digital rectal exam was performed . The Olympus videocolonoscope  was inserted in the rectum and carefully advanced to the cecum, which was identified by the ileocecal valve and appendiceal orifice. The cecum was identified by the ileocecal valve and appendiceal orifice. The quality of preparation was good. The colonoscope was slowly withdrawn with careful evaluation between folds. Retroflexion in the rectum was completed demonstrating internal hemorrhoids. Findings:   Rectum: Grade 1 internal hemorrhoid(s); Sigmoid:     - Diverticulosis  Descending Colon: 5 mm and 6 mm polyps, cold snared  Transverse Colon: 6 mm and 7 mm polyps, cold snared  Ascending Colon: normal  Cecum: normal  Terminal Ileum: not intubated      Specimen Removed:   1. Transverse colon polyps  2 descending colon polyps    Complications: None. EBL:  None. Impression:     1. Descending colon polyps  2 Transverse colon polyps  3 sigmoid diverticulosis  4 Internal hemorrhoids    Recommendations: --Await pathology. , -Repeat colonoscopy in 3 years. High fiber diet. Resume normal medication(s). Discharge Disposition:  Home in the company of a  when able to ambulate. Santa Godinez MD    3/21/2023     DACIA Lange MD  [facility], [address]  [phone]  [email or web address]

## 2023-03-21 NOTE — ANESTHESIA POSTPROCEDURE EVALUATION
Procedure(s):  COLONOSCOPY  ENDOSCOPIC POLYPECTOMY. total IV anesthesia    Anesthesia Post Evaluation        Patient location during evaluation: PACU  Note status: Adequate. Level of consciousness: responsive to verbal stimuli and sleepy but conscious  Pain management: satisfactory to patient  Airway patency: patent  Anesthetic complications: no  Cardiovascular status: acceptable  Respiratory status: acceptable  Hydration status: acceptable  Comments: +Post-Anesthesia Evaluation and Assessment    Patient: Shayy Ramsay MRN: [id number]  SSN: [id number]   YOB: 1950  Age: 74 y.o. Sex: male      Cardiovascular Function/Vital Signs    /60   Pulse (!) 58   Temp 36.3 °C (97.4 °F)   Resp 20   Ht 5' 11\" (1.803 m)   Wt 78.4 kg (172 lb 12.8 oz)   SpO2 96%   BMI 24.10 kg/m²     Patient is status post Procedure(s):  COLONOSCOPY  ENDOSCOPIC POLYPECTOMY. Nausea/Vomiting: Controlled. Postoperative hydration reviewed and adequate. Pain:  Pain Scale 1: Numeric (0 - 10) (03/21/23 1242)  Pain Intensity 1: 0 (03/21/23 1242)   Managed. Neurological Status: At baseline. Mental Status and Level of Consciousness: Arousable. Pulmonary Status:   O2 Device: None (Room air) (03/21/23 1242)   Adequate oxygenation and airway patent. Complications related to anesthesia: None    Post-anesthesia assessment completed. No concerns.     Signed By: Gladys Locke DO    3/21/2023  Post anesthesia nausea and vomiting:  controlled      INITIAL Post-op Vital signs:   Vitals Value Taken Time   /60 03/21/23 1242   Temp 36.3 °C (97.4 °F) 03/21/23 1231   Pulse 58 03/21/23 1242   Resp 20 03/21/23 1242   SpO2 96 % 03/21/23 1242

## 2023-05-19 RX ORDER — ALBUTEROL SULFATE 90 UG/1
1 AEROSOL, METERED RESPIRATORY (INHALATION) EVERY 4 HOURS PRN
COMMUNITY
Start: 2017-02-22

## 2023-05-19 RX ORDER — TADALAFIL 10 MG/1
10 TABLET ORAL PRN
COMMUNITY
Start: 2017-03-07

## (undated) DEVICE — ELECTRODE,RADIOTRANSLUCENT,FOAM,5PK: Brand: MEDLINE

## (undated) DEVICE — Device

## (undated) DEVICE — SYR 10ML LUER LOK 1/5ML GRAD --

## (undated) DEVICE — CATH IV AUTOGRD BC PNK 20GA 25 -- INSYTE

## (undated) DEVICE — SYR 3ML LL TIP 1/10ML GRAD --

## (undated) DEVICE — SET ADMIN 16ML TBNG L100IN 2 Y INJ SITE IV PIGGY BK DISP (ORDER IN MULIPLES OF 48)

## (undated) DEVICE — TRAP,MUCUS SPECIMEN, 80CC: Brand: MEDLINE

## (undated) DEVICE — SNARE ENDOSCP M L240CM W27MM SHTH DIA2.4MM CHN 2.8MM OVL

## (undated) DEVICE — TOWEL 4 PLY TISS 19X30 SUE WHT

## (undated) DEVICE — STRAINER URIN CALC RNL MSH -- CONVERT TO ITEM 357634

## (undated) DEVICE — HYPODERMIC SAFETY NEEDLE: Brand: MAGELLAN

## (undated) DEVICE — NON-REM POLYHESIVE PATIENT RETURN ELECTRODE: Brand: VALLEYLAB

## (undated) DEVICE — BASIN EMSIS 16OZ GRAPHITE PLAS KID SHP MOLD GRAD FOR ORAL

## (undated) DEVICE — CONTAINER SPEC 20 ML LID NEUT BUFF FORMALIN 10 % POLYPR STS

## (undated) DEVICE — NEONATAL-ADULT SPO2 SENSOR: Brand: NELLCOR

## (undated) DEVICE — Z DISCONTINUED PER MEDLINE LINE GAS SAMPLING O2/CO2 LNG AD 13 FT NSL W/ TBNG FILTERLINE

## (undated) DEVICE — 1200 GUARD II KIT W/5MM TUBE W/O VAC TUBE: Brand: GUARDIAN

## (undated) DEVICE — FCPS RAD JAW 4LC 240CM W/NDL -- BX/40